# Patient Record
Sex: FEMALE
[De-identification: names, ages, dates, MRNs, and addresses within clinical notes are randomized per-mention and may not be internally consistent; named-entity substitution may affect disease eponyms.]

---

## 2018-12-11 ENCOUNTER — HOSPITAL ENCOUNTER (OUTPATIENT)
Dept: HOSPITAL 84 - D.CATH | Age: 68
Discharge: HOME | End: 2018-12-11
Attending: INTERNAL MEDICINE
Payer: MEDICARE

## 2018-12-11 VITALS — SYSTOLIC BLOOD PRESSURE: 160 MMHG | DIASTOLIC BLOOD PRESSURE: 58 MMHG

## 2018-12-11 VITALS — HEIGHT: 62 IN | BODY MASS INDEX: 29.51 KG/M2 | WEIGHT: 160.34 LBS

## 2018-12-11 DIAGNOSIS — Z01.812: ICD-10-CM

## 2018-12-11 DIAGNOSIS — I25.119: Primary | ICD-10-CM

## 2018-12-11 DIAGNOSIS — T82.855A: ICD-10-CM

## 2018-12-11 LAB
ANION GAP SERPL CALC-SCNC: 15.2 MMOL/L (ref 8–16)
BASOPHILS NFR BLD AUTO: 0.1 % (ref 0–2)
BUN SERPL-MCNC: 21 MG/DL (ref 7–18)
CALCIUM SERPL-MCNC: 9.4 MG/DL (ref 8.5–10.1)
CHLORIDE SERPL-SCNC: 102 MMOL/L (ref 98–107)
CO2 SERPL-SCNC: 26.8 MMOL/L (ref 21–32)
CREAT SERPL-MCNC: 0.8 MG/DL (ref 0.6–1.3)
EOSINOPHIL NFR BLD: 0 % (ref 0–7)
ERYTHROCYTE [DISTWIDTH] IN BLOOD BY AUTOMATED COUNT: 12.6 % (ref 11.5–14.5)
GLUCOSE SERPL-MCNC: 193 MG/DL (ref 74–106)
HCT VFR BLD CALC: 46.4 % (ref 36–48)
HGB BLD-MCNC: 16.5 G/DL (ref 12–16)
IMM GRANULOCYTES NFR BLD: 0.3 % (ref 0–5)
LYMPHOCYTES NFR BLD AUTO: 7.9 % (ref 15–50)
MCH RBC QN AUTO: 31.7 PG (ref 26–34)
MCHC RBC AUTO-ENTMCNC: 35.6 G/DL (ref 31–37)
MCV RBC: 89.2 FL (ref 80–100)
MONOCYTES NFR BLD: 2.4 % (ref 2–11)
NEUTROPHILS NFR BLD AUTO: 89.3 % (ref 40–80)
OSMOLALITY SERPL CALC.SUM OF ELEC: 286 MOSM/KG (ref 275–300)
PLATELET # BLD: 285 10X3/UL (ref 130–400)
PMV BLD AUTO: 9.8 FL (ref 7.4–10.4)
POTASSIUM SERPL-SCNC: 4 MMOL/L (ref 3.5–5.1)
RBC # BLD AUTO: 5.2 10X6/UL (ref 4–5.4)
SODIUM SERPL-SCNC: 140 MMOL/L (ref 136–145)
WBC # BLD AUTO: 12.3 10X3/UL (ref 4.8–10.8)

## 2019-01-04 ENCOUNTER — HOSPITAL ENCOUNTER (OUTPATIENT)
Dept: HOSPITAL 84 - D.RAD | Age: 69
Discharge: HOME | End: 2019-01-04
Attending: THORACIC SURGERY (CARDIOTHORACIC VASCULAR SURGERY)
Payer: MEDICARE

## 2019-01-04 VITALS — BODY MASS INDEX: 29.3 KG/M2

## 2019-01-04 DIAGNOSIS — R60.0: ICD-10-CM

## 2019-01-04 DIAGNOSIS — I73.9: Primary | ICD-10-CM

## 2019-01-04 DIAGNOSIS — I25.10: ICD-10-CM

## 2019-01-04 DIAGNOSIS — I65.23: ICD-10-CM

## 2019-01-09 NOTE — HP
PATIENT: BELEN GARDINER                              MEDICAL RECORD: [id number]
ACCOUNT: [id number]                                    LOCATION:[facility]         
: 54                                            ADMISSION DATE: 19
                                                         PCP:                        
 
                             HISTORY AND PHYSICAL EXAMINATION
 
 
BELEN Solares (67yo, F) ID# [id number]. Date/Time2018
10:78BQUVC36ervice Dept.NPP_[facility]
ClinicProviderEDARTURO JOINER MDInsuranceMed Primary: [facility] (MEDICARE
REPLACEMENT/ADVANTAGE - PPO)
Insurance # : [id number]
Policy/Group # : [id number]
Referring Provider Name : ALTHEA RODRIGUEZ
Employer Name : 
Med Secondary: [employer] (MEDICARE)
Insurance # : [id number]
Employer Name : 
Prescription: ORX - Member is eligible. Chief Complaint
requested appt with Morteza for CAD
Patient's Care Team
Referring Provider ():  ALTHEA RODRIGUEZ: [address] [place] [id number], Ph [phone], Fax [phone] 
Patient's Pharmacies
[facility] (ERX): [address] [place] [address], Ph [phone]
[phone], Fax [phone]
Vitals
BP:128/68 sitting L arm 2018 10:28 am
136/80 sitting R arm 2018 10:29 amBP Cuff Size:adult 2018 10:28 am
adult 2018 10:29 amHR:60,reg  2018 10:30 amHt:5 ft 2.5 in 2018
10:30 amWt:142 lbs 2018 10:30 amNotes:has been feeling very tired for the past
six months and has had some episodes of chest pain with exertion (like raking
leaves), which subsides with rest 2018 10:31 amBMI:25.6 2018 10:30
amAllergies
Reviewed Allergies
ACCUPRIL: - body turned red with violent shakingALDOMET: Rash, Respiratory
distressAMOXIL: Rash, Respiratory distressAPRESOLINE: - body turned bright red,
violent shaking, hospitalization requiredBARIUM IODIDE: Vomiting - required
hospitalizationBUSPAR: - resp distress, rashDIBENZYLINE: - loss of
consciousnessERYTHROMYCIN BASE: - respiratory distress, rashINVOKANA: - uncontrolled
rapid eye movementIODINE: - rash, welts, respiratory distressMETFORMIN: - rash and
urticariaMINIPRESS: - respiratory distress, rashNORMODYNE: - body bright red,
violent shaking, required hospitalizationPENICILLINS: - respiratory distress,
rashPROCARDIA: - respiratory distress, rashTENEX: - resp distress, rashTENORMIN: -
respiratory distress, rashMedications
Reviewed Medications
acebutolol 200 mg capsule
Take 1 capsule(s) every day by oral route.18   [facility]
10 mg tablet
Take 0.5 tablet(s) every day by oral route.18   [facility] WilsonAspir-81 81
mg tablet,delayed release
Take 1 tablet(s) every day by oral route.18   [facility]OR[facility]epam 0.5
mg tablet
Take 1 tablet(s) every day by oral route at bedtime.18   [facility] 25 mg tablet
Take 1 tablet(s) twice a day by oral route.18   [facility] WilsonProblems
Reviewed Problems
 
 
 
HISTORY AND PHYSICAL                           [id number]    BELEN GARDINER      
 
 
 
Coronary arteriosclerosis - Onset: 2018
Family History
Reviewed Family History
 
Father- Heart diseaseMother- Heart diseasecancerSocial History
Reviewed Social History
Cardiology
Family history of heart disease?: Y
Smoking Status: Former smoker
High Cholesterol: Y
High blood pressure: Y
Overweight: Y
Obese: N
Diabetes: Y
Surgical History
Reviewed Surgical History
 
Hysterectomy/bladder repair
Cholecystectomy
 
CATH/PTCA/STENT 17 
CATH 2018
GYN History
(not configured)
Past Medical History
Reviewed Past Medical History
Angioplasty (balloon): Y
Arm Pain: Y
Blurred Vision: Y
Cancer: Y
Chest Pain: Y
Coronary Artery Disease: Y
Diabetes: Y
Eye Problems: Y
Heart Disease: Y
Hyperlipidemia: Y
Hypertension: Y
Irregular heart beat: Y
Shortness of Breath: Y
Swelling of Ankles, Feet or Hands: Y
Notes: FATIGUE, HEART ATTACK, HEART FLUTTERS, HEART PALPITATIONS, HEART CATH, HEART
STENTS, NUMBNESS OF FEET/LEGS, RELATIVE WITH STROKE/CVA, SIBLING/CHILD W HEART
DISEASE, SORE MUSCLES/JOINTS, TACHYCARDIA, UPPER BACK PAIN, VARICOSE VEINS, WEAKNESS
Documents for Discussion
N/A
Screening
None recorded.
HPI
Fatigue
Reported by patient.
Quality: continuous 
Severity: normal sleep patterns; change in exercise habits
Duration: constant; symptoms lasting over 2 weeks 
Timing: gradual 
 
 
 
HISTORY AND PHYSICAL                           [id number]    BELEN GARDINER      
 
 
Context: symptoms do not improve on weekends/vacations
Modifying Factors: no new stressors in life 
Associated Symptoms: no drug/alcohol withdrawal; no depression; no anxiety; no sleep
disturbances; no snoring; periods of not breathing (apnea) have not been observed;
no recent change in weight
coronary artery disease
ROS
Patient reports chest pain on exertion  but reports no arm pain on exertion, no
short ness of breath when walking, no shortness of breath when lying down, no
palpitations, and no known heart murmur. She reports no fever, no night sweats, no
significant weight gain, no significant weight loss, and no exercise intolerance.
She reports no dry  eyes, no irritation, and no vision change. She reports no
difficulty hearing and no ear pain. She reports no frequent nosebleeds and no
nose/sinus problems. She reports no sore throat, no bleeding gums, no snoring, no
dry mouth, no mouth ulcers, no oral a b normalities, and no teeth problems. She
reports no jugular vein distension and no swollen glands. She reports no cough, no
wheezing, no shortness of breath, and no coughing up blood. She reports no abdominal
pain, no vomiting, normal appetite, no diarrhea ,  not vomiting blood, no nausea,
and no constipation. She reports no incontinence, no difficulty urinating, no
hematuria, and no increased frequency. She reports no muscle aches, no muscle
weakness, no arthralgias/joint pain, no back pain, and no swelling i n the
extremities. She reports no abnormal mole, no jaundice, and no rashes. She reports
no loss of consciousness, no weakness, no numbness, no seizures, no dizziness, and
no headaches. She reports no depression, no sleep disturbances, feeling safe in rel
ationship, and no alcohol abuse. She reports no fatigue. She reports no swollen
glands and no bruising. She reports no runny nose, no sinus pressure, no itching, no
hives, and no frequent sneezing. 
ROS as noted in the HPI
Physical Exam
Patient is a 68-year-old female.
 
Constitutional: General Appearance well nourished and developed and
healthy-appearing. Level of Distress NAD. Ambulation ambulating normally.
 
Cardiovascular: Apical Impulse not displaced or no thrill. Heart Auscultation normal
s1 and s2; no murmurs, rubs, or gallops; and RRR. Arterial Pulses no abdominal aorta
bruits, femoral bruits, or popliteal bruits and 2+ bilateral, carotid 2+ bilateral,
femoral 2+ bilateral, popliteal 2+ bilateral, and dorsalis p jamel 2+ bilateral.
Edema no edema or varicosities.
 
Lungs:  Repiratory Effort no dyspnea. Percussion no hyperresonance or dullness or
flatness. Auscultation no wheezing, rhonchi, or rales / crackles and breathing
sounds normal, good air movement, and CTA except as noted.
 
Abdomen:  Bowl Sounds normal. Inspection and Palpation no tenderness, guarding,
masses, or rebound tenderness and soft and non-distended. Liver non-tender and no
hepatomegaly. Spleen non-tender and no splenomegaly. Hernia none palpable.
 
Musculoskeletal System: Gait And Stance normal gait and stance. Digits and Nails
normal nails and no cyanosis.
 
Neurologic: Cranial Nerves grossly intact. Reflexes DTRs 2+ bilaterally throughout.
Sensation grossly intact.
 
Lymph Nodes: Lymph Nodes no cervical LAD, supraclavicular LAD, axillary LAD, or
 
 
 
HISTORY AND PHYSICAL                           [id number]    BELEN GARDINER      
 
 
inguinal LAD.
 
Eyes: Lids and Conjunctivae no discharge or pallor and non-injected. Pupils PERRLA.
Cornea grossly intact. EOM EOMI. Lens clear. Sclera non-icteric.
 
Neck: Neck no masses, enlarged lymph nodes, or carotid bruits and supple and trachea
midline. Thyroid no enlargement or nodules and non-tender.
 
Skin: Inspection and Palpation no rash, lesions, ulcers, jaundice, or abnormal nevi;
lower extremity varicosity.
Assessment / Plan
1. Coronary arteriosclerosis
I25.10: Atherosclerotic heart disease of native coronary artery without angina
pectoris
 
 
Return to Office
None recorded.
Encounter Sign-Off
Encounter signed-off by Del Joiner MD, 2018.
Encounter performed and documented by Del Joiner MD 
Encounter reviewed & signed by Del Joiner MD on 2018 at 11:08am 
 
 
                                           
                                           ZOHRA KAUFMAN MD            
 
 
 
Electronically Signed by ZOHRA KAUFMAN on 19 at 1324
 
 
 
 
 
 
 
 
 
 
 
 
 
 
 
 
 
CC:                                                             [id number]
DICTATION DATE: 18 1000     : MARIMAR  19 0937      PRE IN  
                                                                              
[facility]                                          
[address] [id number]

## 2019-01-10 ENCOUNTER — HOSPITAL ENCOUNTER (OUTPATIENT)
Dept: HOSPITAL 84 - D.CT | Age: 69
Discharge: HOME | End: 2019-01-10
Attending: THORACIC SURGERY (CARDIOTHORACIC VASCULAR SURGERY)
Payer: MEDICARE

## 2019-01-10 VITALS — BODY MASS INDEX: 29.3 KG/M2

## 2019-01-10 DIAGNOSIS — I25.10: Primary | ICD-10-CM

## 2019-01-11 LAB
ALBUMIN SERPL-MCNC: 2.6 G/DL (ref 3.4–5)
ALP SERPL-CCNC: 99 U/L (ref 46–116)
ALT SERPL-CCNC: 28 U/L (ref 10–68)
ANION GAP SERPL CALC-SCNC: 13.1 MMOL/L (ref 8–16)
APPEARANCE UR: CLEAR
APTT BLD: 27.2 SECONDS (ref 22.8–39.4)
BASOPHILS NFR BLD AUTO: 0.1 % (ref 0–2)
BILIRUB SERPL-MCNC: 0.49 MG/DL (ref 0.2–1.3)
BILIRUB SERPL-MCNC: NEGATIVE MG/DL
BUN SERPL-MCNC: 21 MG/DL (ref 7–18)
CALCIUM SERPL-MCNC: 9.2 MG/DL (ref 8.5–10.1)
CHLORIDE SERPL-SCNC: 104 MMOL/L (ref 98–107)
CO2 SERPL-SCNC: 28.3 MMOL/L (ref 21–32)
COLOR UR: (no result)
CREAT SERPL-MCNC: 0.9 MG/DL (ref 0.6–1.3)
EOSINOPHIL NFR BLD: 0.4 % (ref 0–7)
ERYTHROCYTE [DISTWIDTH] IN BLOOD BY AUTOMATED COUNT: 13 % (ref 11.5–14.5)
EST. AVERAGE GLUCOSE BLD GHB EST-MCNC: 148 MG/DL (ref 74–154)
GLOBULIN SER-MCNC: 4.6 G/L
GLUCOSE SERPL-MCNC: 101 MG/DL (ref 74–106)
GLUCOSE SERPL-MCNC: NEGATIVE MG/DL
HCT VFR BLD CALC: 43.8 % (ref 36–48)
HGB BLD-MCNC: 15.6 G/DL (ref 12–16)
IMM GRANULOCYTES NFR BLD: 0.3 % (ref 0–5)
INR PPP: 1.05 (ref 0.85–1.17)
KETONES UR STRIP-MCNC: NEGATIVE MG/DL
LYMPHOCYTES NFR BLD AUTO: 24.9 % (ref 15–50)
MCH RBC QN AUTO: 31.8 PG (ref 26–34)
MCHC RBC AUTO-ENTMCNC: 35.6 G/DL (ref 31–37)
MCV RBC: 89.2 FL (ref 80–100)
MONOCYTES NFR BLD: 9.3 % (ref 2–11)
NEUTROPHILS NFR BLD AUTO: 65 % (ref 40–80)
NITRITE UR-MCNC: NEGATIVE MG/ML
OSMOLALITY SERPL CALC.SUM OF ELEC: 285 MOSM/KG (ref 275–300)
PH UR STRIP: 6 [PH] (ref 5–6)
PHOSPHATE SERPL-MCNC: 3.2 MG/DL (ref 2.5–4.9)
PLATELET # BLD: 322 10X3/UL (ref 130–400)
PMV BLD AUTO: 9.8 FL (ref 7.4–10.4)
POTASSIUM SERPL-SCNC: 3.4 MMOL/L (ref 3.5–5.1)
PROT SERPL-MCNC: 7.2 G/DL (ref 6.4–8.2)
PROT UR-MCNC: NEGATIVE MG/DL
PROTHROMBIN TIME: 13.2 SECONDS (ref 11.6–15)
RBC # BLD AUTO: 4.91 10X6/UL (ref 4–5.4)
SODIUM SERPL-SCNC: 142 MMOL/L (ref 136–145)
SP GR UR STRIP: 1.01 (ref 1–1.02)
T4 FREE SERPL-MCNC: 1.15 NG/DL (ref 0.76–1.46)
TSH SERPL-ACNC: 0.83 UIU/ML (ref 0.36–3.74)
URATE SERPL-MCNC: 3.1 MG/DL (ref 2.6–7.2)
UROBILINOGEN UR-MCNC: NORMAL MG/DL
WBC # BLD AUTO: 14.1 10X3/UL (ref 4.8–10.8)

## 2019-01-12 LAB — HCV AB S/CO SERPL IA: <0.1 (ref 0–0.9)

## 2019-01-14 ENCOUNTER — HOSPITAL ENCOUNTER (INPATIENT)
Dept: HOSPITAL 84 - D.SDCHOLD | Age: 69
LOS: 6 days | Discharge: HOME | DRG: 236 | End: 2019-01-20
Attending: THORACIC SURGERY (CARDIOTHORACIC VASCULAR SURGERY) | Admitting: THORACIC SURGERY (CARDIOTHORACIC VASCULAR SURGERY)
Payer: MEDICARE

## 2019-01-14 VITALS
HEIGHT: 62 IN | WEIGHT: 155.21 LBS | BODY MASS INDEX: 28.56 KG/M2 | HEIGHT: 62 IN | BODY MASS INDEX: 28.56 KG/M2 | BODY MASS INDEX: 28.56 KG/M2 | WEIGHT: 155.21 LBS

## 2019-01-14 DIAGNOSIS — Z87.891: ICD-10-CM

## 2019-01-14 DIAGNOSIS — I95.89: ICD-10-CM

## 2019-01-14 DIAGNOSIS — E78.5: ICD-10-CM

## 2019-01-14 DIAGNOSIS — I10: ICD-10-CM

## 2019-01-14 DIAGNOSIS — E11.9: ICD-10-CM

## 2019-01-14 DIAGNOSIS — E87.6: ICD-10-CM

## 2019-01-14 DIAGNOSIS — I25.10: Primary | ICD-10-CM

## 2019-01-14 DIAGNOSIS — J90: ICD-10-CM

## 2019-01-15 VITALS — DIASTOLIC BLOOD PRESSURE: 46 MMHG | SYSTOLIC BLOOD PRESSURE: 109 MMHG

## 2019-01-15 VITALS — SYSTOLIC BLOOD PRESSURE: 116 MMHG | DIASTOLIC BLOOD PRESSURE: 53 MMHG

## 2019-01-15 VITALS — SYSTOLIC BLOOD PRESSURE: 112 MMHG | DIASTOLIC BLOOD PRESSURE: 50 MMHG

## 2019-01-15 VITALS — SYSTOLIC BLOOD PRESSURE: 134 MMHG | DIASTOLIC BLOOD PRESSURE: 60 MMHG

## 2019-01-15 VITALS — SYSTOLIC BLOOD PRESSURE: 109 MMHG | DIASTOLIC BLOOD PRESSURE: 44 MMHG

## 2019-01-15 VITALS — DIASTOLIC BLOOD PRESSURE: 46 MMHG | SYSTOLIC BLOOD PRESSURE: 104 MMHG

## 2019-01-15 VITALS — SYSTOLIC BLOOD PRESSURE: 105 MMHG | DIASTOLIC BLOOD PRESSURE: 45 MMHG

## 2019-01-15 VITALS — SYSTOLIC BLOOD PRESSURE: 114 MMHG | DIASTOLIC BLOOD PRESSURE: 51 MMHG

## 2019-01-15 VITALS — SYSTOLIC BLOOD PRESSURE: 121 MMHG | DIASTOLIC BLOOD PRESSURE: 52 MMHG

## 2019-01-15 VITALS — DIASTOLIC BLOOD PRESSURE: 50 MMHG | SYSTOLIC BLOOD PRESSURE: 108 MMHG

## 2019-01-15 VITALS — DIASTOLIC BLOOD PRESSURE: 50 MMHG | SYSTOLIC BLOOD PRESSURE: 121 MMHG

## 2019-01-15 VITALS — DIASTOLIC BLOOD PRESSURE: 53 MMHG | SYSTOLIC BLOOD PRESSURE: 117 MMHG

## 2019-01-15 VITALS — DIASTOLIC BLOOD PRESSURE: 45 MMHG | SYSTOLIC BLOOD PRESSURE: 107 MMHG

## 2019-01-15 VITALS — DIASTOLIC BLOOD PRESSURE: 54 MMHG | SYSTOLIC BLOOD PRESSURE: 123 MMHG

## 2019-01-15 VITALS — SYSTOLIC BLOOD PRESSURE: 93 MMHG | DIASTOLIC BLOOD PRESSURE: 43 MMHG

## 2019-01-15 VITALS — SYSTOLIC BLOOD PRESSURE: 98 MMHG | DIASTOLIC BLOOD PRESSURE: 45 MMHG

## 2019-01-15 VITALS — DIASTOLIC BLOOD PRESSURE: 44 MMHG | SYSTOLIC BLOOD PRESSURE: 106 MMHG

## 2019-01-15 VITALS — DIASTOLIC BLOOD PRESSURE: 54 MMHG | SYSTOLIC BLOOD PRESSURE: 117 MMHG

## 2019-01-15 VITALS — SYSTOLIC BLOOD PRESSURE: 108 MMHG | DIASTOLIC BLOOD PRESSURE: 45 MMHG

## 2019-01-15 VITALS — DIASTOLIC BLOOD PRESSURE: 49 MMHG | SYSTOLIC BLOOD PRESSURE: 107 MMHG

## 2019-01-15 VITALS — DIASTOLIC BLOOD PRESSURE: 50 MMHG | SYSTOLIC BLOOD PRESSURE: 115 MMHG

## 2019-01-15 VITALS — DIASTOLIC BLOOD PRESSURE: 51 MMHG | SYSTOLIC BLOOD PRESSURE: 130 MMHG

## 2019-01-15 VITALS — DIASTOLIC BLOOD PRESSURE: 52 MMHG | SYSTOLIC BLOOD PRESSURE: 130 MMHG

## 2019-01-15 VITALS — DIASTOLIC BLOOD PRESSURE: 47 MMHG | SYSTOLIC BLOOD PRESSURE: 113 MMHG

## 2019-01-15 VITALS — SYSTOLIC BLOOD PRESSURE: 130 MMHG | DIASTOLIC BLOOD PRESSURE: 54 MMHG

## 2019-01-15 VITALS — DIASTOLIC BLOOD PRESSURE: 45 MMHG | SYSTOLIC BLOOD PRESSURE: 98 MMHG

## 2019-01-15 VITALS — DIASTOLIC BLOOD PRESSURE: 51 MMHG | SYSTOLIC BLOOD PRESSURE: 110 MMHG

## 2019-01-15 VITALS — SYSTOLIC BLOOD PRESSURE: 106 MMHG | DIASTOLIC BLOOD PRESSURE: 46 MMHG

## 2019-01-15 VITALS — SYSTOLIC BLOOD PRESSURE: 122 MMHG | DIASTOLIC BLOOD PRESSURE: 55 MMHG

## 2019-01-15 VITALS — SYSTOLIC BLOOD PRESSURE: 122 MMHG | DIASTOLIC BLOOD PRESSURE: 56 MMHG

## 2019-01-15 VITALS — SYSTOLIC BLOOD PRESSURE: 121 MMHG | DIASTOLIC BLOOD PRESSURE: 50 MMHG

## 2019-01-15 VITALS — DIASTOLIC BLOOD PRESSURE: 53 MMHG | SYSTOLIC BLOOD PRESSURE: 120 MMHG

## 2019-01-15 VITALS — DIASTOLIC BLOOD PRESSURE: 44 MMHG | SYSTOLIC BLOOD PRESSURE: 103 MMHG

## 2019-01-15 VITALS — SYSTOLIC BLOOD PRESSURE: 109 MMHG | DIASTOLIC BLOOD PRESSURE: 51 MMHG

## 2019-01-15 VITALS — SYSTOLIC BLOOD PRESSURE: 128 MMHG | DIASTOLIC BLOOD PRESSURE: 51 MMHG

## 2019-01-15 VITALS — SYSTOLIC BLOOD PRESSURE: 112 MMHG | DIASTOLIC BLOOD PRESSURE: 45 MMHG

## 2019-01-15 VITALS — SYSTOLIC BLOOD PRESSURE: 102 MMHG | DIASTOLIC BLOOD PRESSURE: 44 MMHG

## 2019-01-15 VITALS — SYSTOLIC BLOOD PRESSURE: 108 MMHG | DIASTOLIC BLOOD PRESSURE: 49 MMHG

## 2019-01-15 VITALS — DIASTOLIC BLOOD PRESSURE: 43 MMHG | SYSTOLIC BLOOD PRESSURE: 98 MMHG

## 2019-01-15 VITALS — DIASTOLIC BLOOD PRESSURE: 44 MMHG | SYSTOLIC BLOOD PRESSURE: 97 MMHG

## 2019-01-15 VITALS — DIASTOLIC BLOOD PRESSURE: 54 MMHG | SYSTOLIC BLOOD PRESSURE: 131 MMHG

## 2019-01-15 VITALS — SYSTOLIC BLOOD PRESSURE: 120 MMHG | DIASTOLIC BLOOD PRESSURE: 54 MMHG

## 2019-01-15 VITALS — DIASTOLIC BLOOD PRESSURE: 44 MMHG | SYSTOLIC BLOOD PRESSURE: 112 MMHG

## 2019-01-15 VITALS — DIASTOLIC BLOOD PRESSURE: 44 MMHG | SYSTOLIC BLOOD PRESSURE: 94 MMHG

## 2019-01-15 PROCEDURE — B24BZZ4 ULTRASONOGRAPHY OF HEART WITH AORTA, TRANSESOPHAGEAL: ICD-10-PCS | Performed by: THORACIC SURGERY (CARDIOTHORACIC VASCULAR SURGERY)

## 2019-01-15 PROCEDURE — 02100Z9 BYPASS CORONARY ARTERY, ONE ARTERY FROM LEFT INTERNAL MAMMARY, OPEN APPROACH: ICD-10-PCS | Performed by: THORACIC SURGERY (CARDIOTHORACIC VASCULAR SURGERY)

## 2019-01-15 PROCEDURE — 021009W BYPASS CORONARY ARTERY, ONE ARTERY FROM AORTA WITH AUTOLOGOUS VENOUS TISSUE, OPEN APPROACH: ICD-10-PCS | Performed by: THORACIC SURGERY (CARDIOTHORACIC VASCULAR SURGERY)

## 2019-01-15 PROCEDURE — 06BP0ZZ EXCISION OF RIGHT SAPHENOUS VEIN, OPEN APPROACH: ICD-10-PCS | Performed by: THORACIC SURGERY (CARDIOTHORACIC VASCULAR SURGERY)

## 2019-01-15 PROCEDURE — 5A1221Z PERFORMANCE OF CARDIAC OUTPUT, CONTINUOUS: ICD-10-PCS | Performed by: THORACIC SURGERY (CARDIOTHORACIC VASCULAR SURGERY)

## 2019-01-15 NOTE — NUR
RECIEVED REPORT ON PT AT THIS TIME. PT LYING IN BED RESTING WITH EYES CLOSED.
RESPIRATIONS STEADY AND UNLABORED. AWAKENS EASILY WHEN SPOKEN TO. DRESSINGS
CDI. NO ACUTE DISTRESS NOTED. WILL CONTINUE PLAN OF CARE.

## 2019-01-15 NOTE — NUR
PT AOX4, PUPILS 3MM EQUAL AND REACITVE. TONGUE MIDLINE,  EQUAL.
GENERALIZED WEAKNESS PRESENT. SHALLOW RESPIRATIONS, SPO2 97, 4L O2 VIA NC.
LUNG SOUNDS CLEAR/DIMINISHED. S1S2 HEARD, PERIPHERAL PULSES PRESENT. BOWEL
SOUNDS HYPOACTIVE IN ALL QUADRANTS. XIAO CATH INTACT, ANA CARE PROVIDED AT
THIS TIME. RT LEG WITH COBAN FROM GROIN TO ANKLE, NO S/S OF BLEEDING. MIDLINE
INCISION WITH DRSG CDI. SUBSTERNAL CT XT INTACT WITH BLOODY DRAINAGE, JESICA X1
INTACT AND COMPRESSED, DRSGS CDI. PT REPOSITIONED FOR COMFORT. WEAK COUGH,
COMPLETES I/S REACHING 500 X 10. VSS. DENIES NEEDS AT THIS TIME. CALL LIGHT
WITHIN PT REACH, ROOM VISIBLE FROM NURSES STATION. CPOC.

## 2019-01-15 NOTE — NUR
1143 PT ARRIVED TO ROOM FROM OR SEDATED, ETT IN PLACE AND PLACED ON VENT BY
RT, R IJ CVL DRESSING CDI WITH PLASMALYTE 100ML/HR AND AMIODARONE 1MG/MIN OR
10ML/HR, VERIFIED WITH SHAE TO RUN AT THIS RATE FOR 6 HOURS THEN DROP TI
0.5MG/MIN, MIDSTERNAL DRESSING CDI WITH SUBSTERNAL CT (2 SITES Y'D TOGETHER)
TO SUCTION NO AIR LEAK, BLOODY DRAINAGE, JESICA DRAIN COMPRESSED WITH BLOODY
DRAINAGE, TPM WIRE COILED TO CHEST, CRITICORE XIAO DRAINING YELLOW URINE, RLE
HARVEST SITES CDI WITH COBAN FROM ANKLE TO GROIN, LLE SCD AND KIRK IN PLACE,
PULSES PALPABLE, FAMILY UPDATED BY DR KAUFMAN,  AND DAUGHTER IN LAW IN
ROOM, NO QUESTIONS, PT VS WITHIN PARAMETERS,1:1 MONITORING AT THIS TIME

## 2019-01-15 NOTE — NUR
HS MEDS GIVEN, TOLERATED. PT REPOSITONED WITH PROMINENCES BRIDGED. ORAL CARE
PROVIDED. VSS, C/O PAIN 5/10, PRN PAIN MEDICATION GIVEN. COMPLETED I/S
REACHING 500 X10. CALL LIGHT WITHIN PT REACH, ROOM VISIBLE FROM NURSES
STATION. CPOC.

## 2019-01-15 NOTE — NUR
REASSESSMENT COMPLETE, NO NEW CHANGES AT THIS TIME. PT RESTING QUIETLY,
AROUSES EASILY TO VOICE. REPOSITIONED WITH PROMINENCES BRIDGED. WEAK COUGH,
I/S COMPLETED REACHING 500-750 X10. VSS, DENIED NEEDS. CALL LIGHT WITHIN PT
REACH, ROOM VISIBLE FROM NURSES STATION. CPOC.

## 2019-01-15 NOTE — NUR
FAMILY AT BEDSIDE, UPDATE PROVIDED AND QUESTIONS ANSWERED. VSS, FRESH ICE
CHIPS TO BEDSIDE PER REQUEST, PT VOICES NO FURTHER NEEDS AT THIS TIME. ROOM
CALL LIGHT WITHIN PT REACH, ROOM VISIBLE FROM NURSES STATION. CPOC.

## 2019-01-16 VITALS — DIASTOLIC BLOOD PRESSURE: 62 MMHG | SYSTOLIC BLOOD PRESSURE: 114 MMHG

## 2019-01-16 VITALS — DIASTOLIC BLOOD PRESSURE: 44 MMHG | SYSTOLIC BLOOD PRESSURE: 114 MMHG

## 2019-01-16 VITALS — DIASTOLIC BLOOD PRESSURE: 45 MMHG | SYSTOLIC BLOOD PRESSURE: 115 MMHG

## 2019-01-16 VITALS — DIASTOLIC BLOOD PRESSURE: 64 MMHG | SYSTOLIC BLOOD PRESSURE: 113 MMHG

## 2019-01-16 VITALS — SYSTOLIC BLOOD PRESSURE: 125 MMHG | DIASTOLIC BLOOD PRESSURE: 48 MMHG

## 2019-01-16 VITALS — SYSTOLIC BLOOD PRESSURE: 116 MMHG | DIASTOLIC BLOOD PRESSURE: 48 MMHG

## 2019-01-16 VITALS — DIASTOLIC BLOOD PRESSURE: 52 MMHG | SYSTOLIC BLOOD PRESSURE: 127 MMHG

## 2019-01-16 VITALS — SYSTOLIC BLOOD PRESSURE: 139 MMHG | DIASTOLIC BLOOD PRESSURE: 55 MMHG

## 2019-01-16 VITALS — SYSTOLIC BLOOD PRESSURE: 121 MMHG | DIASTOLIC BLOOD PRESSURE: 50 MMHG

## 2019-01-16 VITALS — DIASTOLIC BLOOD PRESSURE: 52 MMHG | SYSTOLIC BLOOD PRESSURE: 124 MMHG

## 2019-01-16 VITALS — DIASTOLIC BLOOD PRESSURE: 48 MMHG | SYSTOLIC BLOOD PRESSURE: 111 MMHG

## 2019-01-16 VITALS — SYSTOLIC BLOOD PRESSURE: 139 MMHG | DIASTOLIC BLOOD PRESSURE: 82 MMHG

## 2019-01-16 VITALS — SYSTOLIC BLOOD PRESSURE: 112 MMHG | DIASTOLIC BLOOD PRESSURE: 43 MMHG

## 2019-01-16 VITALS — DIASTOLIC BLOOD PRESSURE: 45 MMHG | SYSTOLIC BLOOD PRESSURE: 104 MMHG

## 2019-01-16 VITALS — DIASTOLIC BLOOD PRESSURE: 38 MMHG | SYSTOLIC BLOOD PRESSURE: 103 MMHG

## 2019-01-16 VITALS — DIASTOLIC BLOOD PRESSURE: 49 MMHG | SYSTOLIC BLOOD PRESSURE: 110 MMHG

## 2019-01-16 VITALS — DIASTOLIC BLOOD PRESSURE: 60 MMHG | SYSTOLIC BLOOD PRESSURE: 139 MMHG

## 2019-01-16 VITALS — SYSTOLIC BLOOD PRESSURE: 120 MMHG | DIASTOLIC BLOOD PRESSURE: 49 MMHG

## 2019-01-16 VITALS — DIASTOLIC BLOOD PRESSURE: 47 MMHG | SYSTOLIC BLOOD PRESSURE: 119 MMHG

## 2019-01-16 VITALS — SYSTOLIC BLOOD PRESSURE: 141 MMHG | DIASTOLIC BLOOD PRESSURE: 58 MMHG

## 2019-01-16 VITALS — DIASTOLIC BLOOD PRESSURE: 60 MMHG | SYSTOLIC BLOOD PRESSURE: 122 MMHG

## 2019-01-16 VITALS — DIASTOLIC BLOOD PRESSURE: 52 MMHG | SYSTOLIC BLOOD PRESSURE: 132 MMHG

## 2019-01-16 VITALS — DIASTOLIC BLOOD PRESSURE: 51 MMHG | SYSTOLIC BLOOD PRESSURE: 118 MMHG

## 2019-01-16 VITALS — DIASTOLIC BLOOD PRESSURE: 49 MMHG | SYSTOLIC BLOOD PRESSURE: 127 MMHG

## 2019-01-16 VITALS — SYSTOLIC BLOOD PRESSURE: 139 MMHG | DIASTOLIC BLOOD PRESSURE: 58 MMHG

## 2019-01-16 VITALS — DIASTOLIC BLOOD PRESSURE: 52 MMHG | SYSTOLIC BLOOD PRESSURE: 130 MMHG

## 2019-01-16 VITALS — SYSTOLIC BLOOD PRESSURE: 111 MMHG | DIASTOLIC BLOOD PRESSURE: 52 MMHG

## 2019-01-16 VITALS — SYSTOLIC BLOOD PRESSURE: 123 MMHG | DIASTOLIC BLOOD PRESSURE: 50 MMHG

## 2019-01-16 VITALS — SYSTOLIC BLOOD PRESSURE: 133 MMHG | DIASTOLIC BLOOD PRESSURE: 77 MMHG

## 2019-01-16 VITALS — SYSTOLIC BLOOD PRESSURE: 109 MMHG | DIASTOLIC BLOOD PRESSURE: 44 MMHG

## 2019-01-16 VITALS — SYSTOLIC BLOOD PRESSURE: 103 MMHG | DIASTOLIC BLOOD PRESSURE: 55 MMHG

## 2019-01-16 VITALS — DIASTOLIC BLOOD PRESSURE: 48 MMHG | SYSTOLIC BLOOD PRESSURE: 127 MMHG

## 2019-01-16 VITALS — DIASTOLIC BLOOD PRESSURE: 51 MMHG | SYSTOLIC BLOOD PRESSURE: 114 MMHG

## 2019-01-16 VITALS — DIASTOLIC BLOOD PRESSURE: 55 MMHG | SYSTOLIC BLOOD PRESSURE: 140 MMHG

## 2019-01-16 VITALS — DIASTOLIC BLOOD PRESSURE: 47 MMHG | SYSTOLIC BLOOD PRESSURE: 115 MMHG

## 2019-01-16 VITALS — DIASTOLIC BLOOD PRESSURE: 44 MMHG | SYSTOLIC BLOOD PRESSURE: 111 MMHG

## 2019-01-16 VITALS — SYSTOLIC BLOOD PRESSURE: 114 MMHG | DIASTOLIC BLOOD PRESSURE: 68 MMHG

## 2019-01-16 VITALS — SYSTOLIC BLOOD PRESSURE: 110 MMHG | DIASTOLIC BLOOD PRESSURE: 46 MMHG

## 2019-01-16 VITALS — SYSTOLIC BLOOD PRESSURE: 109 MMHG | DIASTOLIC BLOOD PRESSURE: 46 MMHG

## 2019-01-16 VITALS — SYSTOLIC BLOOD PRESSURE: 118 MMHG | DIASTOLIC BLOOD PRESSURE: 53 MMHG

## 2019-01-16 VITALS — SYSTOLIC BLOOD PRESSURE: 113 MMHG | DIASTOLIC BLOOD PRESSURE: 50 MMHG

## 2019-01-16 VITALS — SYSTOLIC BLOOD PRESSURE: 122 MMHG | DIASTOLIC BLOOD PRESSURE: 55 MMHG

## 2019-01-16 VITALS — SYSTOLIC BLOOD PRESSURE: 115 MMHG | DIASTOLIC BLOOD PRESSURE: 49 MMHG

## 2019-01-16 VITALS — SYSTOLIC BLOOD PRESSURE: 112 MMHG | DIASTOLIC BLOOD PRESSURE: 55 MMHG

## 2019-01-16 VITALS — DIASTOLIC BLOOD PRESSURE: 57 MMHG | SYSTOLIC BLOOD PRESSURE: 123 MMHG

## 2019-01-16 VITALS — DIASTOLIC BLOOD PRESSURE: 47 MMHG | SYSTOLIC BLOOD PRESSURE: 125 MMHG

## 2019-01-16 VITALS — DIASTOLIC BLOOD PRESSURE: 43 MMHG | SYSTOLIC BLOOD PRESSURE: 94 MMHG

## 2019-01-16 VITALS — DIASTOLIC BLOOD PRESSURE: 52 MMHG | SYSTOLIC BLOOD PRESSURE: 123 MMHG

## 2019-01-16 VITALS — DIASTOLIC BLOOD PRESSURE: 44 MMHG | SYSTOLIC BLOOD PRESSURE: 103 MMHG

## 2019-01-16 VITALS — SYSTOLIC BLOOD PRESSURE: 132 MMHG | DIASTOLIC BLOOD PRESSURE: 51 MMHG

## 2019-01-16 VITALS — DIASTOLIC BLOOD PRESSURE: 51 MMHG | SYSTOLIC BLOOD PRESSURE: 122 MMHG

## 2019-01-16 VITALS — DIASTOLIC BLOOD PRESSURE: 47 MMHG | SYSTOLIC BLOOD PRESSURE: 112 MMHG

## 2019-01-16 VITALS — DIASTOLIC BLOOD PRESSURE: 45 MMHG | SYSTOLIC BLOOD PRESSURE: 100 MMHG

## 2019-01-16 LAB
ALBUMIN SERPL-MCNC: 2.8 G/DL (ref 3.4–5)
ALP SERPL-CCNC: 54 U/L (ref 46–116)
ALT SERPL-CCNC: 64 U/L (ref 10–68)
ANION GAP SERPL CALC-SCNC: 12.6 MMOL/L (ref 8–16)
BILIRUB SERPL-MCNC: 1.05 MG/DL (ref 0.2–1.3)
BUN SERPL-MCNC: 21 MG/DL (ref 7–18)
CALCIUM SERPL-MCNC: 7.9 MG/DL (ref 8.5–10.1)
CHLORIDE SERPL-SCNC: 106 MMOL/L (ref 98–107)
CO2 SERPL-SCNC: 25.4 MMOL/L (ref 21–32)
CREAT SERPL-MCNC: 0.8 MG/DL (ref 0.6–1.3)
ERYTHROCYTE [DISTWIDTH] IN BLOOD BY AUTOMATED COUNT: 13.2 % (ref 11.5–14.5)
GLOBULIN SER-MCNC: 2.4 G/L
GLUCOSE SERPL-MCNC: 196 MG/DL (ref 74–106)
HCT VFR BLD CALC: 34.5 % (ref 36–48)
HGB BLD-MCNC: 12 G/DL (ref 12–16)
MCH RBC QN AUTO: 31 PG (ref 26–34)
MCHC RBC AUTO-ENTMCNC: 34.8 G/DL (ref 31–37)
MCV RBC: 89.1 FL (ref 80–100)
OSMOLALITY SERPL CALC.SUM OF ELEC: 286 MOSM/KG (ref 275–300)
PLATELET # BLD: 220 10X3/UL (ref 130–400)
PMV BLD AUTO: 9.7 FL (ref 7.4–10.4)
POTASSIUM SERPL-SCNC: 4 MMOL/L (ref 3.5–5.1)
PROT SERPL-MCNC: 5.2 G/DL (ref 6.4–8.2)
RBC # BLD AUTO: 3.87 10X6/UL (ref 4–5.4)
SODIUM SERPL-SCNC: 140 MMOL/L (ref 136–145)
WBC # BLD AUTO: 26.9 10X3/UL (ref 4.8–10.8)

## 2019-01-16 NOTE — NUR
RLE DRSG CHANGED PER ORDER. PT UP TO CHAIR, TOLERATED WELL. VSS. COMPLETE
LINEN CHANGE PROVIDED. COUGH/DB WITH GOOD EFFORT. I/S COMPLETED REACHING 500
X10. CPOC.

## 2019-01-16 NOTE — NUR
DANGLED PT AT BEDSIDE, TOLERATES WELL. STRONG COUGH, PATEL SPUTUM. VSS, PT
POSITIONED BACK IN BED WITH PROMINENCES BRIDGED. FRESH ICE CHIPS TO BEDSIDE
PER REQUEST. DENIES FURTHER NEEDS. CALL LIGHT WITHIN PT REACH. CPOC.

## 2019-01-16 NOTE — NUR
1500 SEEN BY PT
1630 VERIFIED WITH DR KAUFMAN BEFORE COREG ADMINISTERATION, PT STATED THAT SHE
HAS HAD COREG BEFORE AND THAT IT"MADE MY BLOOD PRESSURE GO WAY HIGH", AND DR KAUFMAN AWARE OF OTHER ALLERGIES, ORDERS TO GIVE MED, PT ASSISTED TO BED TO
REMOVE CHEST TUBES AND PT STATED HER LEGS WERE TREMBLING AND HAS HAD THIS AS A
MED REACTION BEFORE BUT DENIES ANY OTHER REACTION, NOTIFIED BEULAH NURSE
SAMUEL, PT THEN STATED TREMBLING HAD STOPPED AND SHE THOUGHT IT MAY BE NERVES
1730 SAMUEL BURT REMOVED CT

## 2019-01-16 NOTE — TEE
PATIENT:BELEN GARDINER              MEDICAL RECORD: [id number]
                                               LOCATION:[address] [address]
AGE OF PATIENT: 68                             ADMISSION DATE: 01/15/19
SEX: F   
 
REFERRING PHYSICIAN:                                                             
 
INTERPRETING PHYSICIAN: BRIGID DC MD             

 
 
                         TRANSESOPHAGEAL ECHOCARDIOGRAM
 Date:              01/15/19      JADEN CHARGE Y
                INDICATIONS: CABG                     
 
             PREMEDICATIONS:                               
 
PATIENT'S RESPONSE PROCEDURE                          
 
                     DOPPLER MEASUREMENTS:
            LVIT            LA           PA           RA      
            LVOT          RVOT      Asc. Ao      
AV Gradient Peak       AV Mean      AV Area      
MV Gradient Peak       MV Mean      MV Area      
 INTERPRETATION:                          
 
 
 
 
               Doppler:                          
 
                   2-D:                          
 
     COLOR FLOW DOPPLER                          
 
   NORMAL SALINE STUDY:                     
 
          MISCELLANOUS:                          
 
             DIAGNOSIS:                          
 
                  PLAN:                          
 
           Cardiologist:2          Dr. Noland                
   Cardiac Sonographer: Chrissy JOHN              
              COMMENTS: SHAE                                        
                                                                                     
 
 
DATE OF SERVICE:  01/15/2019
 
PROCEDURE:  Transesophageal echo evaluation of valvular structures during bypass
surgery.
 
FINDINGS:
1.  Left ventricular chamber size is within normal limits.  Left ventricular
systolic function is normal.  Overall ejection fraction is estimated at 50%.
2.  Left atrium, right atrium, and right ventricle chamber sizes are within
 
 
 
TRANSESOPHAGEAL ECHOCARDIOGRAM REPORT                    [id number]    BELEN GARDINER
 
 
normal limits.
3.  Valvular structures have normal structure and motion.
4.  Doppler interrogation reveals no significant valvular insufficiency or
stenosis.
5.  No evidence of pericardial effusion or left ventricular thrombus.
 
TRANSINT:[id number] Voice Confirmation ID: [id number] DOCUMENT ID: [id number]
 
 
 
Electronically Signed by BRIGID DC on 01/16/19 at 1639
 
 
 
 
 
 
 
 
 
 
 
 
 
 
 
 
 
 
 
 
 
 
 
 
 
 
 
 
 
 
 
 
 
 
 
CC:                                                             [id number]
DICTATION DATE: 01/15/19 1225     :     01/16/19 0046      ADM IN  
                                                                              
[address] [place] [address]

## 2019-01-16 NOTE — NUR
1000 ART LINE AND XIAO REMOVED PER PROTOCOL TIPS INTACT AND NO SIGNS OF
BLEEDING, CVL DRESSING CHANGED
1245ATE SMALL AMOUNT OF CLEAR LIQUID LUNCH

## 2019-01-16 NOTE — NUR
REPORT RECEIVED, SHIFT ASSESSMENT COMPLETED PER FLOW SHEET. AAOX4. PPP.
TELEMETRY MONITORING HR 78, SINUS RHYTHM. VSS. MOVES ALL EXTREMITIES.
FOLLOWING COMMANDS. COUGH/DEEP BREATHING AND USE OF IS ENCOURAGED. PULLING
1000 X10 ON IS. ALLERGIES REVIEWED. DENIES NEEDS. CALL LIGHT WITHIN REACH. SEE
FLOW SHEET FOR COMPLETE ASSESSMENT. WILL CONTINUE TO MONITOR.

## 2019-01-16 NOTE — NUR
REASSESSMENT COMPLETE, NO NEW CHANGES AT THIS TIME. ORAL CARE PROVIDED WITH
MINIMAL ASSISTANCE. PT REPOSITIONED FOR COMFORT WITH PROMINENCES BRIDGED.
COUGH/DB WITH GOOD EFFORT, I/S COMPLETED REACHING 750 X10. DENIES NEEDS. CALL
LIGHT WITHIN PT REACH. CPOC.

## 2019-01-16 NOTE — OP
PATIENT NAME:  BELEN GARDINER                        MEDICAL RECORD: [id number]
:54                                             LOCATION:D.[facility]    [id number]
                                                         ADMISSION DATE:01/15/19
SURGEON:  MICHAEL KAUFMAN MD            
 
 
DATE OF OPERATION:  01/15/2019
 
SURGEON:  Michael Kaufman MD
 
ASSISTANT:  DIONNA Pro MD and Mik Callejas.
 
PROCEDURE PERFORMED:  Coronary artery bypass graft times 2 (left internal
mammary to LAD, reverse saphenous vein graft from aorta to obtuse marginal).
 
PREOPERATIVE DIAGNOSES:  Coronary artery disease.
 
POSTOPERATIVE DIAGNOSIS:  Coronary artery disease.
 
ANESTHESIA:  General endotracheal anesthesia.
 
ESTIMATED BLOOD LOSS:  Total cardiopulmonary bypass.
 
SPECIMENS:  Internal mammary lymph node for permanent specimen.
 
COMPLICATIONS:  None.
 
CONDITION:  Stable.
 
DISPOSITION:  CV ICU.
 
OPERATIVE FINDINGS:
1. Good quality left internal mammary artery.  Intramyocardial 1.5 mm left
internal mammary artery to LAD.
2. Obtuse marginal 1.5 mm.
3. Small diagonal 1 mm with severe disease, not grafted.
 
OPERATIVE INDICATION:  Coronary artery disease.
 
DESCRIPTION OF PROCEDURE:  The patient was brought to the operating suite. 
General anesthesia was obtained, the patient was prepped and draped.  Greater
saphenous vein harvested with 3 bridging incisions, right lower extremity.  Side
branches were divided with clips.  Vessel ligated proximally, distally removed. 
Side branches were tied or oversewn.  This portion of the surgery was performed
by the assistant Dr. Pro.  The utilization of an assistant saved
approximately 30 minutes of general anesthetic time.
 
Median sternotomy incision was made.  Subcutaneous tissue divided with
electrocautery.  The sternum was divided with a saw.  Left hemisternum was
elevated.  Left pleural cavity was entered.  Left internal mammary vein was
taken as a pedicle graft.
 
Sternal retractor was placed.  Pericardium was opened.  Heparin was given. 
Aorta was cannulated.  Dual stage venous cannula was inserted.  The patient was
placed on cardiopulmonary bypass.  Sites for distal anastomoses were selected. 
Crossclamp was placed.  Cardioplegia given through the aortic root and this was
repeated at 15 to 20 minute intervals during the crossclamp time.
 
 
 
 
OPERATIVE REPORT                               [id number]    BELEN GARDINER      
 
 
Distal anastomoses was performed in standard technique, single crossclamp
proximal anastomosis, flow restored.  Proximal and distal anastomotic sites
inspected for bleeding.  The patient fully rewarmed, weaned from cardiopulmonary
bypass and was stable.
 
The patient was decannulated.  Both cannulation sites were oversewn with
pledgeted Prolene suture.  Thorough irrigation was undertaken.  Protamine was
given.  Hemostasis was ensured.  A drain was placed in the mediastinum and left
pleural cavity.  Single ventricular pacing wires were placed.  The Pericardial
fat was loosely reapproximated.  Left chest evacuated and irrigated.  The
internal mammary harvest site was hemostatic.  Sternum was closed with wires. 
Fascia was closed.  Subcutaneous tissue was closed.  Skin was closed.  Dermabond
was placed.  The needle and sponge counts were reported as correct and the
patient was taken to ICU in stable condition.
 
TRANSINT:[id number] Voice Confirmation ID: [id number] DOCUMENT ID: [id number]
                                           
                                           MICHAEL KAUFMAN MD            
 
 
 
Electronically Signed by MICHAEL KAUFMAN on 19 at 1055
 
 
 
 
 
 
 
 
 
 
 
 
 
 
 
 
 
 
 
 
 
 
 
 
 
CC: ALTHEA RODRIGUEZ M.D.                                        [phone]
DICTATION DATE: 01/15/19 175     :     01/15/19 0776      ADM IN  
                                                                              
[id number] [place] [address]

## 2019-01-16 NOTE — NUR
0700 PT RECIEVED UP IN CHAIR ALERT AND ORIENTED VSS O2 2L NC R IJ CVL DRESSING
CDI WITH PLASMALYTE AMIODARONE INSULIN AND NITRO INFUSING, R RADIAL ART LINE
ZEROED WTIH GOOD WAVEFORM WRIST PROTECTOR IN PLACE, MIDSTERNAL AND SUBSTERNAL
DRESSING CDI WITH SUBSTERNAL CT (2Y'D TOGETHER TO ONE DRAINAGE CHAMBER) AND
JESICA DRAIN (COMPRESSED) WITH BLOODY DRAINAGE, TPM WIRE COILED TO CHEST, XIAO
DRAINING YELLOW URINE, RLE HARVEST SITES DRESSINGS CDI
0800 AFTER COMPLAINTS OF CONTINUED PAIN AFTER PERCOCET 5 SPOKE WITH DR RIOJAS
NURSE MOISES AND OKAYED TO GIVE ANOTHER PERCOCET 5 TO EQUAL 10MG PERCOCET, AM
MEDS GIVEN WITHOUT DIFFICULTY
0900 REPOSITIONED IN CHAIR, STATES PAIN COMES AND GOES AND IS SHARP AND
INCISIONAL, ENCOURAGED TO CONTINUE IS USE, COMPLAINTS OF NAUSEA DECREASED
AFTER PRN ZOFRAN, WILL CONTINUE TO MONITOR

## 2019-01-16 NOTE — NUR
PT RESTING QUIETLY WITH VSS, NO S/S OF ACUTE DISTRESS. REPOSITIONED FOR
COMFORT. I/S COMPLETED REACHING 500-750 X10. CALL LIGHT WITHIN PT REACH. CPOC.

## 2019-01-16 NOTE — NUR
CALL LIGHT ANSWERED.  AT BEDSIDE. ASSISSTED PATIENT OOB TO BATHROOM.
VOID X1. ASSISSTED BACK TO BED. WATER WITH ICE PROVIDED. DENIES OTHER NEEDS.
CALL LIGHT WITHIN REACH.

## 2019-01-16 NOTE — NUR
REASSESSMENT COMPLETED PER FLOW SHEET, SEE FOR DETAILS. NO ACUTE CHANGES
NOTED. VSS. DENIES NEEDS. WILL CONTINUE TO MONITOR.

## 2019-01-17 VITALS — SYSTOLIC BLOOD PRESSURE: 117 MMHG | DIASTOLIC BLOOD PRESSURE: 75 MMHG

## 2019-01-17 VITALS — SYSTOLIC BLOOD PRESSURE: 121 MMHG | DIASTOLIC BLOOD PRESSURE: 57 MMHG

## 2019-01-17 VITALS — DIASTOLIC BLOOD PRESSURE: 54 MMHG | SYSTOLIC BLOOD PRESSURE: 117 MMHG

## 2019-01-17 VITALS — DIASTOLIC BLOOD PRESSURE: 53 MMHG | SYSTOLIC BLOOD PRESSURE: 107 MMHG

## 2019-01-17 VITALS — DIASTOLIC BLOOD PRESSURE: 46 MMHG | SYSTOLIC BLOOD PRESSURE: 109 MMHG

## 2019-01-17 VITALS — DIASTOLIC BLOOD PRESSURE: 53 MMHG | SYSTOLIC BLOOD PRESSURE: 126 MMHG

## 2019-01-17 VITALS — SYSTOLIC BLOOD PRESSURE: 105 MMHG | DIASTOLIC BLOOD PRESSURE: 42 MMHG

## 2019-01-17 VITALS — SYSTOLIC BLOOD PRESSURE: 122 MMHG | DIASTOLIC BLOOD PRESSURE: 47 MMHG

## 2019-01-17 VITALS — DIASTOLIC BLOOD PRESSURE: 43 MMHG | SYSTOLIC BLOOD PRESSURE: 103 MMHG

## 2019-01-17 VITALS — SYSTOLIC BLOOD PRESSURE: 106 MMHG | DIASTOLIC BLOOD PRESSURE: 46 MMHG

## 2019-01-17 VITALS — SYSTOLIC BLOOD PRESSURE: 124 MMHG | DIASTOLIC BLOOD PRESSURE: 53 MMHG

## 2019-01-17 VITALS — SYSTOLIC BLOOD PRESSURE: 102 MMHG | DIASTOLIC BLOOD PRESSURE: 43 MMHG

## 2019-01-17 VITALS — SYSTOLIC BLOOD PRESSURE: 104 MMHG | DIASTOLIC BLOOD PRESSURE: 44 MMHG

## 2019-01-17 VITALS — SYSTOLIC BLOOD PRESSURE: 102 MMHG | DIASTOLIC BLOOD PRESSURE: 44 MMHG

## 2019-01-17 VITALS — SYSTOLIC BLOOD PRESSURE: 106 MMHG | DIASTOLIC BLOOD PRESSURE: 40 MMHG

## 2019-01-17 VITALS — DIASTOLIC BLOOD PRESSURE: 61 MMHG | SYSTOLIC BLOOD PRESSURE: 120 MMHG

## 2019-01-17 VITALS — SYSTOLIC BLOOD PRESSURE: 133 MMHG | DIASTOLIC BLOOD PRESSURE: 60 MMHG

## 2019-01-17 VITALS — SYSTOLIC BLOOD PRESSURE: 112 MMHG | DIASTOLIC BLOOD PRESSURE: 50 MMHG

## 2019-01-17 VITALS — SYSTOLIC BLOOD PRESSURE: 111 MMHG | DIASTOLIC BLOOD PRESSURE: 47 MMHG

## 2019-01-17 VITALS — SYSTOLIC BLOOD PRESSURE: 108 MMHG | DIASTOLIC BLOOD PRESSURE: 47 MMHG

## 2019-01-17 VITALS — DIASTOLIC BLOOD PRESSURE: 45 MMHG | SYSTOLIC BLOOD PRESSURE: 110 MMHG

## 2019-01-17 VITALS — SYSTOLIC BLOOD PRESSURE: 120 MMHG | DIASTOLIC BLOOD PRESSURE: 42 MMHG

## 2019-01-17 VITALS — DIASTOLIC BLOOD PRESSURE: 43 MMHG | SYSTOLIC BLOOD PRESSURE: 104 MMHG

## 2019-01-17 VITALS — SYSTOLIC BLOOD PRESSURE: 112 MMHG | DIASTOLIC BLOOD PRESSURE: 45 MMHG

## 2019-01-17 LAB
ALBUMIN SERPL-MCNC: 2.7 G/DL (ref 3.4–5)
ALP SERPL-CCNC: 62 U/L (ref 46–116)
ALT SERPL-CCNC: 66 U/L (ref 10–68)
ANION GAP SERPL CALC-SCNC: 10.6 MMOL/L (ref 8–16)
BILIRUB SERPL-MCNC: 0.89 MG/DL (ref 0.2–1.3)
BUN SERPL-MCNC: 16 MG/DL (ref 7–18)
CALCIUM SERPL-MCNC: 8.3 MG/DL (ref 8.5–10.1)
CHLORIDE SERPL-SCNC: 103 MMOL/L (ref 98–107)
CO2 SERPL-SCNC: 29.4 MMOL/L (ref 21–32)
CREAT SERPL-MCNC: 0.6 MG/DL (ref 0.6–1.3)
ERYTHROCYTE [DISTWIDTH] IN BLOOD BY AUTOMATED COUNT: 13.6 % (ref 11.5–14.5)
GLOBULIN SER-MCNC: 2.7 G/L
GLUCOSE SERPL-MCNC: 131 MG/DL (ref 74–106)
HCT VFR BLD CALC: 34 % (ref 36–48)
HGB BLD-MCNC: 11.6 G/DL (ref 12–16)
MCH RBC QN AUTO: 30.9 PG (ref 26–34)
MCHC RBC AUTO-ENTMCNC: 34.1 G/DL (ref 31–37)
MCV RBC: 90.7 FL (ref 80–100)
OSMOLALITY SERPL CALC.SUM OF ELEC: 280 MOSM/KG (ref 275–300)
PLATELET # BLD: 185 10X3/UL (ref 130–400)
PMV BLD AUTO: 9.8 FL (ref 7.4–10.4)
POTASSIUM SERPL-SCNC: 4 MMOL/L (ref 3.5–5.1)
PROT SERPL-MCNC: 5.4 G/DL (ref 6.4–8.2)
RBC # BLD AUTO: 3.75 10X6/UL (ref 4–5.4)
SODIUM SERPL-SCNC: 139 MMOL/L (ref 136–145)
WBC # BLD AUTO: 18.7 10X3/UL (ref 4.8–10.8)

## 2019-01-17 NOTE — NUR
COMPLETE BED BATH GIVEN WITH SOAP AND WATER. COMPLETE BED LINEN CHANGE
PROVIDED. NEW HOSPITAL GOWN PROVIDED. ASSISSTED OOB TO CHAIR. TOLERATED ALL
WELL. CALL LIGHT WITHIN REACH. WILL CONTINUE TO MONITOR.

## 2019-01-17 NOTE — NUR
Received patient resting in bed with eyes open, assessment completed per
flowsheet. Patient AO x4, calm and cooperative. S1/S2 noted NSR on telemetry,
rythmic and regular. Breathing is shallow/unlabored on room air with O2 sat
93%, lung sounds clear bilateral upper and mid with diminished lower. Midline
sternal incision dressing CDI, Substernal dressing CDI. TPM wires
coiled/secure, Salazar drain x1 with small bloody drainage noted. All pulses
palpable with cap refill < 3 sec, skin warm/dry. Denies pain or other needs at
this time, see flowsheet for details. All VSS and will continue to monitor.

## 2019-01-17 NOTE — NUR
SPOKE WITH NIK KAUFMAN'S NURSE REGARDING PERCOCET AND TYLENOL. PT ALSO
MENTIONED IT TO DR. JOINER WHEN HE WAS AT BEDSIDE. PT WANTS TO TAKE TYLENOL
FOR PAIN INSTEAD OF PERCOCET. PERCOCET MAKES HER HALLUCINATE.

## 2019-01-17 NOTE — NUR
REASSESSMENT COMPLETED PER FLOW SHEET, SEE FOR DETAILS. ASSISSTED OOB TO
BATHROOM. VOIDED X1. ASSISSTED BACK IN BED. DENIES OTHER NEEDS. VSS. WILL
CONTINUE TO MONITOR. CALL LIGHT WITHIN REACH.

## 2019-01-17 NOTE — NUR
Patient family at bedside for visitation, discussed post-op care/medications
with all questions answered to satisfaction. Assisted to bathroom at request,
375ml urine noted. No further needs at this time, will continue to monitor.

## 2019-01-17 NOTE — NUR
SHIFT REPORT RECEIVED. PT RESTING COMFORTABLY IN CHAIR. A&OX4. REPORT PAIN
6/10 AT INCISIONAL SITE. DOES NOT WANT PAIN MEDICATION AT THIS TIME. SHE
STATES THAT IT HURTS WHEN SHE BREATHES BUT SHE CAN TOLERATED IT. HAS RIJ CVL
SALINE LOCK. MIDSTERNAL DRESSING CDI. L-JESICA DRAIN IN PLACE. TPM WIRES IN
PLACE. DRESSING CDI. R LEG HARVEST SITES DRESSING CDI. SHIFT ASSESSMENT
COMPLETED. WILL CONTINUE TO MONITOR.

## 2019-01-17 NOTE — NUR
SUBSTERNAL DRESSING CHANGED PER ORDERS. PT RESTING IN CHAIR. REPORT DISCOMFORT
ON INCISION SITE. DOES NOT WANT PAIN MEDICATION AT THIS TIME. WILL CONTINUE TO
MONITOR.

## 2019-01-17 NOTE — NUR
AMBULATED TO BATHROOM WITH NURSE. VOIDED X 1. NO STOOL NOTED. AMBULATED ABOUT
250FT WITH PHYSICAL THERAPY WITH NO COMPLICATIONS. BACK IN CHAIR AT THIS TIME.
WILL CONTINUE TO MONITOR.

## 2019-01-17 NOTE — NUR
RE-ASSESSMENT COMPLETED AND CHARTED. PT REPORTS SEEING BUGS ON FLOOR. NURSE
DID NOT SEE BUGS. SHE STATES THAT PERCOCET SEEMS TOO STRONG FOR HER. SHE
PREFERS TO USE TYLENOL FOR PAIN. NO FURHTER COMPLAINTS AT THIS TIME. WILL
CONTINUE TO MONITOR.

## 2019-01-17 NOTE — NUR
Reassessment completed per flowsheet, patient resting in bed with eyes closed.
S1/S2 noted NSR on telemetry, rythmic and regular. Breathing is
shallow/unlabored on room air with O2 sat 92%, lung sounds clear bilateral
upper and mid with diminished lower. Midline sternal incision dressing CDI,
substernal dressing CDI. Salazar drain x1 with small bloody drainage noted, TPM
wires coiled/secure. All pulses palpable with cap refill < sec, skin warm/dry.
No further needs at this time, see flowsheet for details. All VSS and will
continue to monitor.

## 2019-01-17 NOTE — MORECARE
CASE MANAGEMENT DISCHARGE SUMMARY
 
 
PATIENT: BELEN GARDINER                  UNIT: [id number]
ACCOUNT#: [id number]                       ADM DATE: 01/15/19
AGE: 68     : 54  SEX: F            ROOM/BED: D.[facility]    
AUTHOR: JOHN,DOC                             PHYSICIAN:                               
 
REFERRING PHYSICIAN: ZOHRA KAUFMAN MD            
DATE OF SERVICE: 19
Discharge Plan
 
 
Patient Name: BELEN GARDINER
Facility: [facility]:[place]
Encounter #: [id number]
Medical Record #: [id number]
: 1950
Planned Disposition: Home
Anticipated Discharge Date: 
 
Discharge Date: 
Expected LOS: 
Initial Reviewer: [id number]
Initial Review Date: 01/15/2019
Generated: 19   1:04 pm 
Comments
 
DCP- Discharge Planning
 
Updated by [email or web address]: Adrianne Blair on 19  11:03 am CT
Patient Name: BELEN GARDINER                                     
Admission Status: Elective   
Accout number: [id number]                              
Admission Date: 01-   
: 1950                                                        
Admission Diagnosis:   
Attending: ZOHRA KAUFMAN                                                
Current LOS:  2   
  
Anticipated DC Date:    
Planned Disposition: Home   
Primary Insurance: [facility] MEDICARE SOLUTIONS   
  
  
Discharge Planning Comments: CM met with patient at bedside after obtaining 
verbal consent.  Patient states she plans on returning home after discharge 
with her .  Patient states she will have family transport her home via 
private vehicle.  Patient denies any discharge needs at this time. CM will 
continue to follow and assist as needed for discharge planning / needs.  
 
  
  
  
  
  
  
: Adrianne Blair
 DCPIA - Discharge Planning Initial Assessment
 
Updated by [id number]: Adrianne Blair on 19  12:02 pm
*  Is the patient Alert and Oriented?
Yes
*  How many steps to enter\exit or inside your home?
 
*  PCP
[facility]OR[facility]
*  Pharmacy
[facility]
*  Preadmission Environment
Home with Family
*  ADLs
Independent
*  Equipment
Walker
*  Other Equipment
BSC
*  List name and contact numbers for known caregivers / representatives who 
currently or will assist patient after discharge:
NIELS GARDINER - - [phone]
*  Verbal permission to speak to the caregivers and representatives has been 
obtained from the patient.
N/A
*  Community resources currently utilized
None
*  Additional services required to return to the preadmission environment?
No
*  Can the patient safely return to the preadmission environment?
Yes
*  Has this patient been hospitalized within the prior 30 days at any 
hospital?
No
 
 
 
 
 
 
Patient Name: BELEN GARDINER
 
Encounter #: [id number]
Page [id number]
 
 
 
 
 
Electronically Signed by VINH LOPEZ on 19 at 1204
 
 
 
 
 
 
**All edits/amendments must be made on the electronic document**
 
DICTATION DATE: 19     : MARIMAR  19     
RPT#: [id number]                                DC DATE:        
                                               STATUS: ADM IN  
[facility]
 [place] [address]
***END OF REPORT***

## 2019-01-18 VITALS — SYSTOLIC BLOOD PRESSURE: 121 MMHG | DIASTOLIC BLOOD PRESSURE: 52 MMHG

## 2019-01-18 VITALS — SYSTOLIC BLOOD PRESSURE: 121 MMHG | DIASTOLIC BLOOD PRESSURE: 48 MMHG

## 2019-01-18 VITALS — SYSTOLIC BLOOD PRESSURE: 117 MMHG | DIASTOLIC BLOOD PRESSURE: 52 MMHG

## 2019-01-18 VITALS — SYSTOLIC BLOOD PRESSURE: 113 MMHG | DIASTOLIC BLOOD PRESSURE: 44 MMHG

## 2019-01-18 VITALS — SYSTOLIC BLOOD PRESSURE: 104 MMHG | DIASTOLIC BLOOD PRESSURE: 45 MMHG

## 2019-01-18 VITALS — SYSTOLIC BLOOD PRESSURE: 121 MMHG | DIASTOLIC BLOOD PRESSURE: 51 MMHG

## 2019-01-18 VITALS — SYSTOLIC BLOOD PRESSURE: 109 MMHG | DIASTOLIC BLOOD PRESSURE: 40 MMHG

## 2019-01-18 VITALS — SYSTOLIC BLOOD PRESSURE: 114 MMHG | DIASTOLIC BLOOD PRESSURE: 39 MMHG

## 2019-01-18 VITALS — SYSTOLIC BLOOD PRESSURE: 105 MMHG | DIASTOLIC BLOOD PRESSURE: 43 MMHG

## 2019-01-18 VITALS — SYSTOLIC BLOOD PRESSURE: 122 MMHG | DIASTOLIC BLOOD PRESSURE: 59 MMHG

## 2019-01-18 VITALS — DIASTOLIC BLOOD PRESSURE: 38 MMHG | SYSTOLIC BLOOD PRESSURE: 108 MMHG

## 2019-01-18 VITALS — SYSTOLIC BLOOD PRESSURE: 118 MMHG | DIASTOLIC BLOOD PRESSURE: 49 MMHG

## 2019-01-18 VITALS — SYSTOLIC BLOOD PRESSURE: 113 MMHG | DIASTOLIC BLOOD PRESSURE: 45 MMHG

## 2019-01-18 VITALS — SYSTOLIC BLOOD PRESSURE: 121 MMHG | DIASTOLIC BLOOD PRESSURE: 55 MMHG

## 2019-01-18 VITALS — DIASTOLIC BLOOD PRESSURE: 58 MMHG | SYSTOLIC BLOOD PRESSURE: 126 MMHG

## 2019-01-18 VITALS — DIASTOLIC BLOOD PRESSURE: 40 MMHG | SYSTOLIC BLOOD PRESSURE: 104 MMHG

## 2019-01-18 VITALS — SYSTOLIC BLOOD PRESSURE: 111 MMHG | DIASTOLIC BLOOD PRESSURE: 47 MMHG

## 2019-01-18 VITALS — DIASTOLIC BLOOD PRESSURE: 42 MMHG | SYSTOLIC BLOOD PRESSURE: 107 MMHG

## 2019-01-18 VITALS — SYSTOLIC BLOOD PRESSURE: 128 MMHG | DIASTOLIC BLOOD PRESSURE: 54 MMHG

## 2019-01-18 VITALS — DIASTOLIC BLOOD PRESSURE: 49 MMHG | SYSTOLIC BLOOD PRESSURE: 109 MMHG

## 2019-01-18 LAB
ALBUMIN SERPL-MCNC: 2.4 G/DL (ref 3.4–5)
ALP SERPL-CCNC: 62 U/L (ref 46–116)
ALT SERPL-CCNC: 44 U/L (ref 10–68)
ANION GAP SERPL CALC-SCNC: 9.9 MMOL/L (ref 8–16)
BILIRUB SERPL-MCNC: 0.98 MG/DL (ref 0.2–1.3)
BUN SERPL-MCNC: 12 MG/DL (ref 7–18)
CALCIUM SERPL-MCNC: 7.9 MG/DL (ref 8.5–10.1)
CHLORIDE SERPL-SCNC: 104 MMOL/L (ref 98–107)
CO2 SERPL-SCNC: 29.7 MMOL/L (ref 21–32)
CREAT SERPL-MCNC: 0.6 MG/DL (ref 0.6–1.3)
ERYTHROCYTE [DISTWIDTH] IN BLOOD BY AUTOMATED COUNT: 13.2 % (ref 11.5–14.5)
GLOBULIN SER-MCNC: 3 G/L
GLUCOSE SERPL-MCNC: 125 MG/DL (ref 74–106)
HCT VFR BLD CALC: 31.6 % (ref 36–48)
HCT VFR BLD CALC: 33 % (ref 36–48)
HGB BLD-MCNC: 10.8 G/DL (ref 12–16)
HGB BLD-MCNC: 11.2 G/DL (ref 12–16)
MCH RBC QN AUTO: 30.8 PG (ref 26–34)
MCHC RBC AUTO-ENTMCNC: 33.9 G/DL (ref 31–37)
MCV RBC: 90.7 FL (ref 80–100)
OSMOLALITY SERPL CALC.SUM OF ELEC: 279 MOSM/KG (ref 275–300)
PLATELET # BLD: 174 10X3/UL (ref 130–400)
PMV BLD AUTO: 10.1 FL (ref 7.4–10.4)
POTASSIUM SERPL-SCNC: 3.6 MMOL/L (ref 3.5–5.1)
PROT SERPL-MCNC: 5.4 G/DL (ref 6.4–8.2)
RBC # BLD AUTO: 3.64 10X6/UL (ref 4–5.4)
SODIUM SERPL-SCNC: 140 MMOL/L (ref 136–145)
WBC # BLD AUTO: 10.9 10X3/UL (ref 4.8–10.8)

## 2019-01-18 NOTE — NUR
REASSESSMENT COMPLETE, PLEASE SEE FLOW SHEETS FOR DETAILS. NO ACUTE CHANGES
FROM PREVIOUS ASSESSMENT TO NOTE. DENIES PAIN ATT, GOT UP TO BATHROOM, VOIDED.
AMBULATED SELF WELL. DENIES ANY NEEDS. BED LOW AND LOCKED, CALL LIGHT IN
REACH. HEMODYNAMICALLY STABLE ATT. WILL CPOC.

## 2019-01-18 NOTE — NUR
Nutrition Follow Up:
Pt reported that her appetite is improving and she is tolerating diet
advancement. Pt requested info on heart healthy diet. She said that she
follows an ADA diet at home but wanted to add AHA diet guidelines as well.
RD provided pt with written info and discussed low Na, low fat, etc with pt.
RD encouraged pt to continue increasing po intake as able to promote healing,
maintain strength, etc.
Diet: ADA/AHA
PO Intake: 30% meal avg
Wt gain noted
No BM since admit
I>O
Labs reviewed
Meds noted including Reglan
Rec continue current diet.
Will honor food preferences within diet restrictions.
RD following.

## 2019-01-18 NOTE — NUR
Reassessment completed per flowsheet, patinet sleeping in bed with eyes
closed. S1/S2 noted NSR on telemetry, rythmic and regular. Breathing is
shallow/unlabored on room air with O2 sat 93%, lung sounds clear bilateral
upper and mid with diminished lower. Midline sternal incision dressing CDI,
Substernal dressing changed. TPM wires coiled, Salazar drain x1 with scant
bloody drainage noted. R leg harvest site dressing changed, well approximated
with staples intact. Denies pain or other needs at this time, see flowsheet
for details. All VSS and will continue to monitor.

## 2019-01-18 NOTE — NUR
TOLERATED PO MEDS WELL. GOT UP TO RESTROOM, AMBULATED WITH NO ASSISTANCE, GAIT
STEADY. HAD C/O PAIN IN NECK, GAVE PAIN MEDS PER ORDERS. DENIES ANY OTHER
NEEDS. HEMODYNAMICALLY STABLE ATT, BED LOW AND LOCKED, CALL LIGHT IN REACH.
WILL CPOC.

## 2019-01-18 NOTE — NUR
Patient sleeping in bed with eyes closed, no s/s of distress at this time. No
further needs at this time, all VSS and will continue to monitor.

## 2019-01-18 NOTE — NUR
Family at bedisde for visitation, patient up in chair at bedside. Assisted to
bathroom at request, clear yellow urine noted. No further needs at this time,
all VSS and will continue to monitor.

## 2019-01-18 NOTE — NUR
REPORT RECIEVED, SHIFT ASSESSMENT COMPLETE, PLEASE SEE FLOW SHEETS FOR
DETAILS. PATIENT SITTING UP IN CHAIR. DENIES PAIN/NEEDS NOW. RR EVEN AND
UNLABORED AND LUNG SOUNDS CLEAR. S1S2 HEARD AND RRR NOTED ON CM, PPP, CAP
REFIL <3 SECONDS. KIRK'S ON, DRESSINGS CDI ON RIGHT LEG. MIDSTERNAL AND
SUBSTERNAL DRESSINGS CDI. BS HYPO X4. DEMONSTRATED 1000 ON I.S. TEACHING ON
HOW OFTEN SHOULD BE USING GIVEN. HEMODYNAMICALLY STABLE ATT. CALL LIGHT IN
REACH. WILL CPOC.

## 2019-01-18 NOTE — NUR
0730-RECIEVED AWAKE AND ALERT-SITTING UP IN CHAIR-REQUESTING REGULAR BREAKFEST
TRAY-SAME ORDERED
0930-AMBULATED IN HALLWAY WITH PHYSICAL THERAPY-IN HALLWAY-SR ON MONITOR
1045-AMBULATED TO RESTROOM
1145-NOTED COPIOUS SEROUS/ SANG DRAINAGE IN L JESICA DRAIN-EMPTIED AND
RECOMPRESSED FOR SUCTIONX2-THIRD EMPTYING AND COMPRESSION-PT CRIED OUT IN
SEVERE PAIN STATED L LATERAL "10"-CAN'T STAND IT -NOTED O2 SAT 95%-PT STATED
NOT ABLE TO BREATH-O2 PLACED ON PT -DR KAUFMAN IN UNIT AND NURSE MOISES LANDEROS
RN-BROUGHT TO BEDSIDE-ASSISTED PT WITH DIFFICULTY TO BED-RADIOLOGY TECHNICIAN
PRESENT IN UNIT WITH PORT XRAY-CHEST XRAY UPRIGHT STAT DONE -VIEWED BY DR KAUFMAN-PT CONTINUES TO DESCRIBE PAIN AS EXCRUCIATING AND CRYING OUT
LOUDLY-MORPHINE 2 MG IVP GIVEN STAT-
LATERAL JESICA DRAIN REMOVED BY DIONNA LANDEROS RN AS DIRECTED BY DR KAUFMAN-PT
EXPRESSED IMMEDIATE RELIEF OF PAIN -FACIAL SHOWS RELIEF-REMAINS UP RIGHT
1300-RESTING COMFORTABLY
1500-FAMILY PRESENT AND ASSISTED PT TO REST ROOM -AMBULATING WITHOUT
DIFFICULTY
1630-SITTING UP IN CHAIR -DINNER TRAY TAKEN
1710-AMBULATING IN HALLWAY AND REMAINS SR ON MONITOR-STATES NO FURTHER L
LATERAL PAIN NOTED

## 2019-01-19 VITALS — SYSTOLIC BLOOD PRESSURE: 106 MMHG | DIASTOLIC BLOOD PRESSURE: 55 MMHG

## 2019-01-19 VITALS — DIASTOLIC BLOOD PRESSURE: 59 MMHG | SYSTOLIC BLOOD PRESSURE: 124 MMHG

## 2019-01-19 VITALS — SYSTOLIC BLOOD PRESSURE: 141 MMHG | DIASTOLIC BLOOD PRESSURE: 59 MMHG

## 2019-01-19 VITALS — SYSTOLIC BLOOD PRESSURE: 133 MMHG | DIASTOLIC BLOOD PRESSURE: 59 MMHG

## 2019-01-19 VITALS — DIASTOLIC BLOOD PRESSURE: 49 MMHG | SYSTOLIC BLOOD PRESSURE: 112 MMHG

## 2019-01-19 VITALS — DIASTOLIC BLOOD PRESSURE: 50 MMHG | SYSTOLIC BLOOD PRESSURE: 116 MMHG

## 2019-01-19 VITALS — DIASTOLIC BLOOD PRESSURE: 61 MMHG | SYSTOLIC BLOOD PRESSURE: 146 MMHG

## 2019-01-19 VITALS — DIASTOLIC BLOOD PRESSURE: 56 MMHG | SYSTOLIC BLOOD PRESSURE: 110 MMHG

## 2019-01-19 VITALS — DIASTOLIC BLOOD PRESSURE: 66 MMHG | SYSTOLIC BLOOD PRESSURE: 130 MMHG

## 2019-01-19 VITALS — DIASTOLIC BLOOD PRESSURE: 57 MMHG | SYSTOLIC BLOOD PRESSURE: 102 MMHG

## 2019-01-19 VITALS — SYSTOLIC BLOOD PRESSURE: 112 MMHG | DIASTOLIC BLOOD PRESSURE: 57 MMHG

## 2019-01-19 VITALS — SYSTOLIC BLOOD PRESSURE: 151 MMHG | DIASTOLIC BLOOD PRESSURE: 64 MMHG

## 2019-01-19 VITALS — DIASTOLIC BLOOD PRESSURE: 47 MMHG | SYSTOLIC BLOOD PRESSURE: 102 MMHG

## 2019-01-19 VITALS — SYSTOLIC BLOOD PRESSURE: 119 MMHG | DIASTOLIC BLOOD PRESSURE: 44 MMHG

## 2019-01-19 VITALS — DIASTOLIC BLOOD PRESSURE: 53 MMHG | SYSTOLIC BLOOD PRESSURE: 116 MMHG

## 2019-01-19 VITALS — DIASTOLIC BLOOD PRESSURE: 53 MMHG | SYSTOLIC BLOOD PRESSURE: 129 MMHG

## 2019-01-19 VITALS — DIASTOLIC BLOOD PRESSURE: 53 MMHG | SYSTOLIC BLOOD PRESSURE: 133 MMHG

## 2019-01-19 VITALS — SYSTOLIC BLOOD PRESSURE: 140 MMHG | DIASTOLIC BLOOD PRESSURE: 62 MMHG

## 2019-01-19 VITALS — DIASTOLIC BLOOD PRESSURE: 51 MMHG | SYSTOLIC BLOOD PRESSURE: 110 MMHG

## 2019-01-19 VITALS — DIASTOLIC BLOOD PRESSURE: 47 MMHG | SYSTOLIC BLOOD PRESSURE: 115 MMHG

## 2019-01-19 VITALS — SYSTOLIC BLOOD PRESSURE: 128 MMHG | DIASTOLIC BLOOD PRESSURE: 63 MMHG

## 2019-01-19 VITALS — SYSTOLIC BLOOD PRESSURE: 112 MMHG | DIASTOLIC BLOOD PRESSURE: 55 MMHG

## 2019-01-19 VITALS — SYSTOLIC BLOOD PRESSURE: 98 MMHG | DIASTOLIC BLOOD PRESSURE: 45 MMHG

## 2019-01-19 VITALS — SYSTOLIC BLOOD PRESSURE: 153 MMHG | DIASTOLIC BLOOD PRESSURE: 64 MMHG

## 2019-01-19 LAB
ALBUMIN SERPL-MCNC: 2.4 G/DL (ref 3.4–5)
ALP SERPL-CCNC: 59 U/L (ref 46–116)
ALT SERPL-CCNC: 36 U/L (ref 10–68)
ANION GAP SERPL CALC-SCNC: 12 MMOL/L (ref 8–16)
BILIRUB SERPL-MCNC: 0.87 MG/DL (ref 0.2–1.3)
BUN SERPL-MCNC: 12 MG/DL (ref 7–18)
CALCIUM SERPL-MCNC: 8.2 MG/DL (ref 8.5–10.1)
CHLORIDE SERPL-SCNC: 109 MMOL/L (ref 98–107)
CO2 SERPL-SCNC: 28.3 MMOL/L (ref 21–32)
CREAT SERPL-MCNC: 0.7 MG/DL (ref 0.6–1.3)
ERYTHROCYTE [DISTWIDTH] IN BLOOD BY AUTOMATED COUNT: 13.2 % (ref 11.5–14.5)
GLOBULIN SER-MCNC: 3.1 G/L
GLUCOSE SERPL-MCNC: 119 MG/DL (ref 74–106)
HCT VFR BLD CALC: 33.2 % (ref 36–48)
HGB BLD-MCNC: 11.4 G/DL (ref 12–16)
MCH RBC QN AUTO: 30.8 PG (ref 26–34)
MCHC RBC AUTO-ENTMCNC: 34.3 G/DL (ref 31–37)
MCV RBC: 89.7 FL (ref 80–100)
OSMOLALITY SERPL CALC.SUM OF ELEC: 291 MOSM/KG (ref 275–300)
PLATELET # BLD: 219 10X3/UL (ref 130–400)
PMV BLD AUTO: 9.9 FL (ref 7.4–10.4)
POTASSIUM SERPL-SCNC: 3.3 MMOL/L (ref 3.5–5.1)
PROT SERPL-MCNC: 5.5 G/DL (ref 6.4–8.2)
RBC # BLD AUTO: 3.7 10X6/UL (ref 4–5.4)
SODIUM SERPL-SCNC: 146 MMOL/L (ref 136–145)
WBC # BLD AUTO: 8.4 10X3/UL (ref 4.8–10.8)

## 2019-01-19 NOTE — NUR
0715-RECIEVED PER FLOW SHEET-AWAKE AND ALERT-DENIES ANY FURTHER PAIN-STATES
NOTICED EASIER TO TAKE A DEEP BREATH-SR ON MONITOR-AMBULATING WITHOUT
DIFFICULTY
1000-AMBULATING IN HALLWAY WITH

## 2019-01-19 NOTE — NUR
IN BED NOW, RESTING. DENIES PAIN/NEEDS. HEMODYNAMICALLY STABLE. BEDLOW AND
LOCKED, CALL LIGHT IN REACH. WILL CPOC.

## 2019-01-19 NOTE — NUR
PATIENT BATHED SELF. FULL LINEN CHANGE PROVIDED. KIRK'S BACK ON.
HEMODYNAMICALLY STABLE DURING AND POST ACTIVITY. DENIES PAIN/NEEDS. UP TO
CHAIR. CALL LIGHT IN REACH. VOID X1. WILL CPOC.

## 2019-01-19 NOTE — NUR
REPORT RECIEVED, SHIFT ASSESSMENT COMPLETE, PLEASE SEE FLOW SHEETS FOR
DETAILS. UP IN CHAIR, AWAKE, ALERT, CONTENT. DENIES PAIN/NEEDS. STATES HAD A
GREAT DAY. LUNGS CLEAR, RA. S1S2 HEARD, RRR NOTED ON CM, PPP. 1+ EDEMA NOTED
IN HANDS/FEET. MOVES ALL EXTREMETIES. BS ACTIVE X4. HEMODYNAMICALLY STABE ATT,
CHAIR LOCKED, CALL LIGHT IN REACH. WILL CPOC.

## 2019-01-19 NOTE — NUR
REASSESSMENT COMPLETE, PLEASE SEE FLOW SHEETS FOR DETAILS. NO ACUTE CHANGES
FROM PREVIOUS ASSESSMENT TO NOTE. HEMODYNAMICALLY STABLE ATT. BED LOW AND
LOCKED, CALL LIGHT IN REACH. WILL CPOC.

## 2019-01-19 NOTE — NUR
REASSESSMENT COMPLETE, PLEASE SEE FLOW SHEETS FOR DETAILS. NO ACUTE CHANGES
FROM PREVIOUS ASSESSMENT TO NOTE. DENIES PAIN/NEEDS ATT. HEMODYNAMICALLY
STABE, BED LOW AND LOCKED, CALL LIGHT IN REACH. WILL CPOC.

## 2019-01-19 NOTE — NUR
1330-PT UP IN CHAIR-DR KAUFMAN AT [facility]-PACER WIRES REMOVED BY SAME-DIRECTED
TO STAY IN CHAIR FOR 30MIN
1430-R IJ D/C'D AS DIRECTED BY DR KAUFMAN-PER PP

## 2019-01-20 VITALS — DIASTOLIC BLOOD PRESSURE: 60 MMHG | SYSTOLIC BLOOD PRESSURE: 144 MMHG

## 2019-01-20 VITALS — SYSTOLIC BLOOD PRESSURE: 123 MMHG | DIASTOLIC BLOOD PRESSURE: 89 MMHG

## 2019-01-20 VITALS — DIASTOLIC BLOOD PRESSURE: 49 MMHG | SYSTOLIC BLOOD PRESSURE: 127 MMHG

## 2019-01-20 VITALS — DIASTOLIC BLOOD PRESSURE: 48 MMHG | SYSTOLIC BLOOD PRESSURE: 104 MMHG

## 2019-01-20 VITALS — SYSTOLIC BLOOD PRESSURE: 131 MMHG | DIASTOLIC BLOOD PRESSURE: 55 MMHG

## 2019-01-20 VITALS — SYSTOLIC BLOOD PRESSURE: 131 MMHG | DIASTOLIC BLOOD PRESSURE: 61 MMHG

## 2019-01-20 VITALS — DIASTOLIC BLOOD PRESSURE: 53 MMHG | SYSTOLIC BLOOD PRESSURE: 107 MMHG

## 2019-01-20 VITALS — SYSTOLIC BLOOD PRESSURE: 116 MMHG | DIASTOLIC BLOOD PRESSURE: 55 MMHG

## 2019-01-20 VITALS — SYSTOLIC BLOOD PRESSURE: 106 MMHG | DIASTOLIC BLOOD PRESSURE: 40 MMHG

## 2019-01-20 VITALS — DIASTOLIC BLOOD PRESSURE: 50 MMHG | SYSTOLIC BLOOD PRESSURE: 136 MMHG

## 2019-01-20 VITALS — SYSTOLIC BLOOD PRESSURE: 113 MMHG | DIASTOLIC BLOOD PRESSURE: 53 MMHG

## 2019-01-20 LAB
ALBUMIN SERPL-MCNC: 2.4 G/DL (ref 3.4–5)
ALP SERPL-CCNC: 58 U/L (ref 46–116)
ALT SERPL-CCNC: 30 U/L (ref 10–68)
ANION GAP SERPL CALC-SCNC: 15.5 MMOL/L (ref 8–16)
BILIRUB SERPL-MCNC: 0.72 MG/DL (ref 0.2–1.3)
BUN SERPL-MCNC: 11 MG/DL (ref 7–18)
CALCIUM SERPL-MCNC: 8.4 MG/DL (ref 8.5–10.1)
CHLORIDE SERPL-SCNC: 110 MMOL/L (ref 98–107)
CO2 SERPL-SCNC: 25.5 MMOL/L (ref 21–32)
CREAT SERPL-MCNC: 0.8 MG/DL (ref 0.6–1.3)
ERYTHROCYTE [DISTWIDTH] IN BLOOD BY AUTOMATED COUNT: 13.4 % (ref 11.5–14.5)
GLOBULIN SER-MCNC: 3.3 G/L
GLUCOSE SERPL-MCNC: 113 MG/DL (ref 74–106)
HCT VFR BLD CALC: 35 % (ref 36–48)
HGB BLD-MCNC: 11.8 G/DL (ref 12–16)
MCH RBC QN AUTO: 30.7 PG (ref 26–34)
MCHC RBC AUTO-ENTMCNC: 33.7 G/DL (ref 31–37)
MCV RBC: 91.1 FL (ref 80–100)
OSMOLALITY SERPL CALC.SUM OF ELEC: 291 MOSM/KG (ref 275–300)
PLATELET # BLD: 269 10X3/UL (ref 130–400)
PMV BLD AUTO: 9.7 FL (ref 7.4–10.4)
POTASSIUM SERPL-SCNC: 4 MMOL/L (ref 3.5–5.1)
PROT SERPL-MCNC: 5.7 G/DL (ref 6.4–8.2)
RBC # BLD AUTO: 3.84 10X6/UL (ref 4–5.4)
SODIUM SERPL-SCNC: 147 MMOL/L (ref 136–145)
WBC # BLD AUTO: 8.1 10X3/UL (ref 4.8–10.8)

## 2019-01-20 NOTE — NUR
0715-RECIEVED AWAKE AND ALERT -AMBULATING EASILY IN ROOM-SR ON MONITOR
1030-AMBULATING IN HALLWAY  PRESENT
1230-DR KAUFMAN SPOKEWITH PT AND  IN DETAIL REGARDING POST OP
INSTRUCTIONS: DIRECTED TO ELEVATE LEGS ON PILLOWS WHEN LYING IN BED, CHECK
BLOOD PRESSURE THROUGHOUT DAY AND CALL OFFICE IF STARTS GETTING HIGH-INFORMED
WHICH MEDICATIONS TO TAKE AT HOME AND SCRIPT VIA COMPUTER TO [facility]-PT
CONFIRMED THAT IS THE CORRECT ONE
1330-REMOVED CHEST INCISION DRG AND INCISION VIEWED BY -WELL INTACT AND
NO DRAINAGE-INFORMED SURGICAL GLUE:NO SOAP ROUGH TOWELLING, LOTIONS OINTMENT
OR GELS TO INCISION LINE-WILL DEGRADE GLUE SOONER-DO NOT PICK OFF. PROVIDED
WITH TELEPHONE NUMBERS TO CALL FOR QUESTIONS AND CONCERNS
PT DISCHARGED IN CARE OF

## 2019-01-20 NOTE — NUR
RESTING, HEMODYNAMICALLY STABLE. NO S&S ACUTE DISTRESS TO NOTE. BED LOW AND
LOCKED, CALL LIGHT IN REACH. WILL CPOC.

## 2019-01-20 NOTE — NUR
REASSESSMENT COMPLETE, PLEASE SEE FLOW SHEETS FOR DETAILS. NO ACUTE CHANGES
FROM PREVIOUS ASSESSMENT TO NOTE. DENIES PAIN/NEEDS ATT. HEMODYNAMICALLY
STABLE, BED LOW AND LOCKED, CALL LIGHT IN REACH. WILL CPOC.

## 2019-01-21 NOTE — MORECARE
CASE MANAGEMENT DISCHARGE SUMMARY
 
 
PATIENT: BELEN GARDINER                  UNIT: [id number]
ACCOUNT#: [id number]                       ADM DATE: 01/15/19
AGE: 68     : 54  SEX: F            ROOM/BED: D.06    
AUTHOR: JOHN,DOC                             PHYSICIAN:                               
 
REFERRING PHYSICIAN: ZOHRA KAUFMAN MD            
DATE OF SERVICE: 19
Discharge Plan
 
 
Patient Name: BELEN GARDINER
Facility: [facility]:[place]
Encounter #: [id number]
Medical Record #: [id number]
: 1950
Planned Disposition: Home
Anticipated Discharge Date: 
 
Discharge Date: 2019
Expected LOS: 
Initial Reviewer: [id number]
Initial Review Date: 01/15/2019
Generated: 19  11:06 am 
Comments
 
DCP- Discharge Planning
 
Updated by [id number]: Adrianne Blair on 19  11:03 am CT
Patient Name: BELEN GARDINER                                     
Admission Status: Elective   
Accout number: [id number]                              
Admission Date: 01-   
: 1950                                                        
Admission Diagnosis:   
Attending: ZOHRA KAUFMAN                                                
Current LOS:  2   
  
Anticipated DC Date:    
Planned Disposition: Home   
Primary Insurance: [facility] MEDICARE SOLUTIONS   
  
  
Discharge Planning Comments: CM met with patient at bedside after obtaining 
verbal consent.  Patient states she plans on returning home after discharge 
with her .  Patient states she will have family transport her home via 
private vehicle.  Patient denies any discharge needs at this time. CM will 
continue to follow and assist as needed for discharge planning / needs.  
 
  
  
  
  
  
  
: Adrianne Blair
 DCPIA - Discharge Planning Initial Assessment
 
Updated by [id number]: Adrianne Blair on 19  12:02 pm
*  Is the patient Alert and Oriented?
Yes
*  How many steps to enter\exit or inside your home?
 
*  PCP
[facility]
*  [facility]acy
[facility]
*  Preadmission Environment
Home with Family
*  ADLs
Independent
*  Equipment
Walker
*  Other Equipment
BSC
*  List name and contact numbers for known caregivers / representatives who 
currently or will assist patient after discharge:
NIELS GARDINER - - [phone]
*  Verbal permission to speak to the caregivers and representatives has been 
obtained from the patient.
N/A
*  Community resources currently utilized
None
*  Additional services required to return to the preadmission environment?
No
*  Can the patient safely return to the preadmission environment?
Yes
*  Has this patient been hospitalized within the prior 30 days at any 
hospital?
No
 
 
 
 
 
 
 
Last DP export: 19  11:04 a
Patient Name: BELEN GARDINER
 
Encounter #: [id number]
Page [id number]
 
 
 
 
 
Electronically Signed by VINH LOPEZ on 19 at 1006
 
 
 
 
 
 
**All edits/amendments must be made on the electronic document**
 
DICTATION DATE: 19     : MARIMAR  19 1005     
RPT#: [id number]                                DC DATE:19
                                               STATUS: DIS IN  
[facility]
[address] [place] [address]
***END OF REPORT***

## 2019-01-31 ENCOUNTER — HOSPITAL ENCOUNTER (OUTPATIENT)
Dept: HOSPITAL 84 - D.ER | Age: 69
Setting detail: OBSERVATION
LOS: 1 days | Discharge: HOME | End: 2019-02-01
Attending: INTERNAL MEDICINE | Admitting: INTERNAL MEDICINE
Payer: MEDICARE

## 2019-01-31 ENCOUNTER — HOSPITAL ENCOUNTER (EMERGENCY)
Dept: HOSPITAL 84 - D.ER | Age: 69
Discharge: HOME | End: 2019-01-31
Payer: MEDICARE

## 2019-01-31 VITALS — SYSTOLIC BLOOD PRESSURE: 127 MMHG | DIASTOLIC BLOOD PRESSURE: 69 MMHG

## 2019-01-31 VITALS — WEIGHT: 147.31 LBS | HEIGHT: 62 IN | BODY MASS INDEX: 27.11 KG/M2

## 2019-01-31 VITALS
HEIGHT: 62 IN | BODY MASS INDEX: 26.93 KG/M2 | BODY MASS INDEX: 26.93 KG/M2 | HEIGHT: 62 IN | WEIGHT: 146.31 LBS | WEIGHT: 146.31 LBS

## 2019-01-31 DIAGNOSIS — I10: ICD-10-CM

## 2019-01-31 DIAGNOSIS — E11.9: ICD-10-CM

## 2019-01-31 DIAGNOSIS — I48.92: Primary | ICD-10-CM

## 2019-01-31 DIAGNOSIS — F41.9: ICD-10-CM

## 2019-01-31 DIAGNOSIS — I25.10: ICD-10-CM

## 2019-01-31 DIAGNOSIS — Z95.1: ICD-10-CM

## 2019-01-31 DIAGNOSIS — Z87.891: ICD-10-CM

## 2019-01-31 LAB
ALBUMIN SERPL-MCNC: 3 G/DL (ref 3.4–5)
ALBUMIN SERPL-MCNC: 3.2 G/DL (ref 3.4–5)
ALP SERPL-CCNC: 108 U/L (ref 46–116)
ALP SERPL-CCNC: 116 U/L (ref 46–116)
ALT SERPL-CCNC: 17 U/L (ref 10–68)
ALT SERPL-CCNC: 19 U/L (ref 10–68)
ANION GAP SERPL CALC-SCNC: 13.8 MMOL/L (ref 8–16)
ANION GAP SERPL CALC-SCNC: 16 MMOL/L (ref 8–16)
APTT BLD: 30 SECONDS (ref 22.8–39.4)
BASOPHILS NFR BLD AUTO: 1.5 % (ref 0–2)
BASOPHILS NFR BLD AUTO: 1.9 % (ref 0–2)
BILIRUB SERPL-MCNC: 0.33 MG/DL (ref 0.2–1.3)
BILIRUB SERPL-MCNC: 0.51 MG/DL (ref 0.2–1.3)
BUN SERPL-MCNC: 14 MG/DL (ref 7–18)
BUN SERPL-MCNC: 17 MG/DL (ref 7–18)
CALCIUM SERPL-MCNC: 8.9 MG/DL (ref 8.5–10.1)
CALCIUM SERPL-MCNC: 9.3 MG/DL (ref 8.5–10.1)
CHLORIDE SERPL-SCNC: 105 MMOL/L (ref 98–107)
CHLORIDE SERPL-SCNC: 107 MMOL/L (ref 98–107)
CK MB SERPL-MCNC: 0.5 U/L (ref 0–3.6)
CK MB SERPL-MCNC: 1 U/L (ref 0–3.6)
CK SERPL-CCNC: 33 UL (ref 21–215)
CK SERPL-CCNC: 35 UL (ref 21–215)
CO2 SERPL-SCNC: 23.8 MMOL/L (ref 21–32)
CO2 SERPL-SCNC: 25.8 MMOL/L (ref 21–32)
CREAT SERPL-MCNC: 0.9 MG/DL (ref 0.6–1.3)
CREAT SERPL-MCNC: 0.9 MG/DL (ref 0.6–1.3)
EOSINOPHIL NFR BLD: 3.8 % (ref 0–7)
EOSINOPHIL NFR BLD: 6.3 % (ref 0–7)
ERYTHROCYTE [DISTWIDTH] IN BLOOD BY AUTOMATED COUNT: 14 % (ref 11.5–14.5)
ERYTHROCYTE [DISTWIDTH] IN BLOOD BY AUTOMATED COUNT: 14.1 % (ref 11.5–14.5)
GLOBULIN SER-MCNC: 3.7 G/L
GLOBULIN SER-MCNC: 4.1 G/L
GLUCOSE SERPL-MCNC: 121 MG/DL (ref 74–106)
GLUCOSE SERPL-MCNC: 143 MG/DL (ref 74–106)
HCT VFR BLD CALC: 39.7 % (ref 36–48)
HCT VFR BLD CALC: 41.6 % (ref 36–48)
HGB BLD-MCNC: 13.2 G/DL (ref 12–16)
HGB BLD-MCNC: 14.1 G/DL (ref 12–16)
IMM GRANULOCYTES NFR BLD: 0.1 % (ref 0–5)
IMM GRANULOCYTES NFR BLD: 0.2 % (ref 0–5)
INR PPP: 1.06 (ref 0.85–1.17)
LYMPHOCYTES NFR BLD AUTO: 24.3 % (ref 15–50)
LYMPHOCYTES NFR BLD AUTO: 34.5 % (ref 15–50)
MAGNESIUM SERPL-MCNC: 2.1 MG/DL (ref 1.8–2.4)
MCH RBC QN AUTO: 30.5 PG (ref 26–34)
MCH RBC QN AUTO: 30.9 PG (ref 26–34)
MCHC RBC AUTO-ENTMCNC: 33.2 G/DL (ref 31–37)
MCHC RBC AUTO-ENTMCNC: 33.9 G/DL (ref 31–37)
MCV RBC: 91.2 FL (ref 80–100)
MCV RBC: 91.7 FL (ref 80–100)
MONOCYTES NFR BLD: 10.2 % (ref 2–11)
MONOCYTES NFR BLD: 11.2 % (ref 2–11)
NEUTROPHILS NFR BLD AUTO: 48.4 % (ref 40–80)
NEUTROPHILS NFR BLD AUTO: 57.6 % (ref 40–80)
OSMOLALITY SERPL CALC.SUM OF ELEC: 283 MOSM/KG (ref 275–300)
OSMOLALITY SERPL CALC.SUM OF ELEC: 287 MOSM/KG (ref 275–300)
PLATELET # BLD: 482 10X3/UL (ref 130–400)
PLATELET # BLD: 486 10X3/UL (ref 130–400)
PMV BLD AUTO: 9.6 FL (ref 7.4–10.4)
PMV BLD AUTO: 9.7 FL (ref 7.4–10.4)
POTASSIUM SERPL-SCNC: 3.6 MMOL/L (ref 3.5–5.1)
POTASSIUM SERPL-SCNC: 3.8 MMOL/L (ref 3.5–5.1)
PROT SERPL-MCNC: 6.7 G/DL (ref 6.4–8.2)
PROT SERPL-MCNC: 7.3 G/DL (ref 6.4–8.2)
PROTHROMBIN TIME: 13.3 SECONDS (ref 11.6–15)
RBC # BLD AUTO: 4.33 10X6/UL (ref 4–5.4)
RBC # BLD AUTO: 4.56 10X6/UL (ref 4–5.4)
SODIUM SERPL-SCNC: 141 MMOL/L (ref 136–145)
SODIUM SERPL-SCNC: 143 MMOL/L (ref 136–145)
TROPONIN I SERPL-MCNC: 0.02 NG/ML (ref 0–0.06)
TROPONIN I SERPL-MCNC: < 0.017 NG/ML (ref 0–0.06)
WBC # BLD AUTO: 6.9 10X3/UL (ref 4.8–10.8)
WBC # BLD AUTO: 8.5 10X3/UL (ref 4.8–10.8)

## 2019-01-31 NOTE — NUR
ARRIVED TO FLOOR VIA WHEELCHAIR, ACCOMPANIED BY HOSPITAL STAFF AND SPOUSE.
ORIENTED TO UNIT AND PLACED ON TELEMETRY 88 FLUTTER WITH AFIB AND SINUS BEATS
ON TELEMETRY.  RIGHT FOREARM SALINE LOCKED.  CALL LIGHT IN REACH.  SEE NURSE
ASSESSMENT.

## 2019-02-01 VITALS — DIASTOLIC BLOOD PRESSURE: 58 MMHG | SYSTOLIC BLOOD PRESSURE: 122 MMHG

## 2019-02-01 VITALS — DIASTOLIC BLOOD PRESSURE: 51 MMHG | SYSTOLIC BLOOD PRESSURE: 99 MMHG

## 2019-02-01 VITALS — DIASTOLIC BLOOD PRESSURE: 45 MMHG | SYSTOLIC BLOOD PRESSURE: 107 MMHG

## 2019-02-01 LAB
ALBUMIN SERPL-MCNC: 2.7 G/DL (ref 3.4–5)
ALP SERPL-CCNC: 98 U/L (ref 46–116)
ALT SERPL-CCNC: 16 U/L (ref 10–68)
ANION GAP SERPL CALC-SCNC: 13.6 MMOL/L (ref 8–16)
BASOPHILS NFR BLD AUTO: 1.6 % (ref 0–2)
BILIRUB SERPL-MCNC: 0.39 MG/DL (ref 0.2–1.3)
BUN SERPL-MCNC: 17 MG/DL (ref 7–18)
CALCIUM SERPL-MCNC: 8.7 MG/DL (ref 8.5–10.1)
CHLORIDE SERPL-SCNC: 106 MMOL/L (ref 98–107)
CK MB SERPL-MCNC: 0.7 U/L (ref 0–3.6)
CK MB SERPL-MCNC: 0.9 U/L (ref 0–3.6)
CK SERPL-CCNC: 26 UL (ref 21–215)
CK SERPL-CCNC: 27 UL (ref 21–215)
CO2 SERPL-SCNC: 26.6 MMOL/L (ref 21–32)
CREAT SERPL-MCNC: 0.8 MG/DL (ref 0.6–1.3)
EOSINOPHIL NFR BLD: 5.3 % (ref 0–7)
ERYTHROCYTE [DISTWIDTH] IN BLOOD BY AUTOMATED COUNT: 14.4 % (ref 11.5–14.5)
GLOBULIN SER-MCNC: 3.5 G/L
GLUCOSE SERPL-MCNC: 119 MG/DL (ref 74–106)
HCT VFR BLD CALC: 36.9 % (ref 36–48)
HGB BLD-MCNC: 12.4 G/DL (ref 12–16)
IMM GRANULOCYTES NFR BLD: 0.2 % (ref 0–5)
LYMPHOCYTES NFR BLD AUTO: 33 % (ref 15–50)
MCH RBC QN AUTO: 30.9 PG (ref 26–34)
MCHC RBC AUTO-ENTMCNC: 33.6 G/DL (ref 31–37)
MCV RBC: 92 FL (ref 80–100)
MONOCYTES NFR BLD: 12.3 % (ref 2–11)
NEUTROPHILS NFR BLD AUTO: 47.6 % (ref 40–80)
OSMOLALITY SERPL CALC.SUM OF ELEC: 285 MOSM/KG (ref 275–300)
PLATELET # BLD: 445 10X3/UL (ref 130–400)
PMV BLD AUTO: 9.6 FL (ref 7.4–10.4)
POTASSIUM SERPL-SCNC: 4.2 MMOL/L (ref 3.5–5.1)
PROT SERPL-MCNC: 6.2 G/DL (ref 6.4–8.2)
RBC # BLD AUTO: 4.01 10X6/UL (ref 4–5.4)
SODIUM SERPL-SCNC: 142 MMOL/L (ref 136–145)
TROPONIN I SERPL-MCNC: 0.02 NG/ML (ref 0–0.06)
TROPONIN I SERPL-MCNC: < 0.017 NG/ML (ref 0–0.06)
WBC # BLD AUTO: 8.2 10X3/UL (ref 4.8–10.8)

## 2019-02-01 NOTE — MORECARE
CASE MANAGEMENT DISCHARGE SUMMARY
 
 
PATIENT: BELEN GARDINER                  UNIT: [id number]
ACCOUNT#: [id number]                       ADM DATE: 19
AGE: 68     : 54  SEX: F            ROOM/BED: D.4235    
AUTHOR: VINH LOPEZ                             PHYSICIAN:                               
 
REFERRING PHYSICIAN: BRIGID DC MD             
DATE OF SERVICE: 19
Discharge Plan
 
 
Patient Name: BELEN GARDINER
[facility]
Encounter #: [id number]
Medical Record #: [id number]
: 1950
Planned Disposition: Home
Anticipated Discharge Date: 19
 
Discharge Date: 2019
Expected LOS: 1
Initial Reviewer: [id number]
Initial Review Date: 2019
Generated: 19   8:48 pm 
  
 
 
 
 
 
 
 
Patient Name: BELEN GARDINER
 
Encounter #: [id number]
Page [phone]
 
 
 
 
 
Electronically Signed by VINH LOPEZ on 19 at 1948
 
 
 
 
 
 
**All edits/amendments must be made on the electronic document**
 
DICTATION DATE: 19     : MARIMAR  19     
RPT#: [id number]                                DC DATE:19
                                               STATUS: DIS IN  
[facility]
[address] [facility] [place] [address]
***END OF REPORT***

## 2019-02-01 NOTE — NUR
REVIEWED DISCHARGE INSTRUCTIONS WITH PT STATES UNDERSTANDING COPY GIVEN DCD
SALINE LOCK TO RFA WITH IV CATHETER INTACT SITE FREE OF REDNESS OR EDEMA PT
DISCHARGED HOME LEFT UNIT VIA W/C IN STABLE CONDITION WITH ALL PERSONAL
BELONGINGS

## 2019-02-07 NOTE — DS
PATIENT:BELEN GARDINER                :[birth year]   MEDICAL RECORD: [id number]
 
                              DISCHARGE SUMMARY
                                                         
ADMISSION DATE:    19                       DISCHARGE DATE:     19
 
 
DISCHARGE DIAGNOSES:
1.  Atrial fibrillation.
2.  Coronary artery disease.
3.  Status post coronary artery bypass graft surgery.
4.  Hypertension.
 
HOSPITAL COURSE:  Mrs. Gardiner presents with recurrent atrial fibrillation after
heart surgery, found to be in atrial fibrillation, had 1 dose of sotalol,
converted to sinus rhythm and was discharged home on sotalol 80 mg b.i.d.  Will
follow up with [facility] as previously scheduled.
 
TRANSINT:[id number] Voice Confirmation ID: [id number] DOCUMENT ID: [id number]
                                           
                                           BRIGID DC MD             
 
 
 
Electronically Signed by BRIGID DC on 19 at 1710
 
 
 
 
 
 
 
 
 
 
 
 
 
 
 
 
 
 
 
 
 
 
 
 
 
CC:                                                             [id number]
DICTATION DATE: 19 1009     :     19 0528      DIS IN  
                                                                      19
[facility]                                          
[address] [place] [address]

## 2019-02-13 ENCOUNTER — HOSPITAL ENCOUNTER (OUTPATIENT)
Dept: HOSPITAL 84 - D.LAB | Age: 69
Discharge: HOME | End: 2019-02-13
Attending: ORTHOPAEDIC SURGERY
Payer: MEDICARE

## 2019-02-13 DIAGNOSIS — D64.9: ICD-10-CM

## 2019-02-13 DIAGNOSIS — J91.8: Primary | ICD-10-CM

## 2019-02-13 LAB
ALBUMIN SERPL-MCNC: 3.2 G/DL (ref 3.4–5)
ALP SERPL-CCNC: 121 U/L (ref 46–116)
ALT SERPL-CCNC: 19 U/L (ref 10–68)
ANION GAP SERPL CALC-SCNC: 11.7 MMOL/L (ref 8–16)
BILIRUB SERPL-MCNC: 0.39 MG/DL (ref 0.2–1.3)
BUN SERPL-MCNC: 17 MG/DL (ref 7–18)
CALCIUM SERPL-MCNC: 8.8 MG/DL (ref 8.5–10.1)
CHLORIDE SERPL-SCNC: 107 MMOL/L (ref 98–107)
CO2 SERPL-SCNC: 28.9 MMOL/L (ref 21–32)
CREAT SERPL-MCNC: 0.8 MG/DL (ref 0.6–1.3)
ERYTHROCYTE [DISTWIDTH] IN BLOOD BY AUTOMATED COUNT: 14.5 % (ref 11.5–14.5)
GLOBULIN SER-MCNC: 3.5 G/L
GLUCOSE SERPL-MCNC: 138 MG/DL (ref 74–106)
HCT VFR BLD CALC: 39.7 % (ref 36–48)
HGB BLD-MCNC: 13.3 G/DL (ref 12–16)
MCH RBC QN AUTO: 31.1 PG (ref 26–34)
MCHC RBC AUTO-ENTMCNC: 33.5 G/DL (ref 31–37)
MCV RBC: 93 FL (ref 80–100)
OSMOLALITY SERPL CALC.SUM OF ELEC: 290 MOSM/KG (ref 275–300)
PLATELET # BLD: 261 10X3/UL (ref 130–400)
PMV BLD AUTO: 10.2 FL (ref 7.4–10.4)
POTASSIUM SERPL-SCNC: 3.6 MMOL/L (ref 3.5–5.1)
PROT SERPL-MCNC: 6.7 G/DL (ref 6.4–8.2)
RBC # BLD AUTO: 4.27 10X6/UL (ref 4–5.4)
SODIUM SERPL-SCNC: 144 MMOL/L (ref 136–145)
WBC # BLD AUTO: 6.7 10X3/UL (ref 4.8–10.8)

## 2021-10-19 NOTE — NUR
RESTING COMFORTABLY IN CHAIR. WALKED WITH PHYSICAL THERAPY
AGAIN THIS AFTERNOON. SHE WALKED
ABOUT 250FT. PERCOCET 5MG TAB WAS GIVEN FOR PAIN AFTER WALKING. CHICKEN BROTH
AND SALTINE CRACKERS PROVIDED PER PT REQUEST. Yes